# Patient Record
Sex: MALE | Race: OTHER | Employment: FULL TIME | URBAN - METROPOLITAN AREA
[De-identification: names, ages, dates, MRNs, and addresses within clinical notes are randomized per-mention and may not be internally consistent; named-entity substitution may affect disease eponyms.]

---

## 2017-05-10 ENCOUNTER — OFFICE VISIT (OUTPATIENT)
Dept: FAMILY MEDICINE CLINIC | Age: 57
End: 2017-05-10

## 2017-05-10 VITALS
SYSTOLIC BLOOD PRESSURE: 126 MMHG | BODY MASS INDEX: 26.92 KG/M2 | OXYGEN SATURATION: 98 % | HEART RATE: 82 BPM | RESPIRATION RATE: 16 BRPM | HEIGHT: 70 IN | DIASTOLIC BLOOD PRESSURE: 78 MMHG | TEMPERATURE: 98.2 F | WEIGHT: 188 LBS

## 2017-05-10 DIAGNOSIS — Z00.00 ROUTINE MEDICAL EXAM: Primary | ICD-10-CM

## 2017-05-10 DIAGNOSIS — Z23 ENCOUNTER FOR IMMUNIZATION: ICD-10-CM

## 2017-05-10 DIAGNOSIS — Z11.59 SCREENING FOR VIRAL DISEASE: ICD-10-CM

## 2017-05-10 DIAGNOSIS — Z13.6 SCREENING FOR CARDIOVASCULAR CONDITION: ICD-10-CM

## 2017-05-10 DIAGNOSIS — N52.9 ERECTILE DYSFUNCTION, UNSPECIFIED ERECTILE DYSFUNCTION TYPE: ICD-10-CM

## 2017-05-10 DIAGNOSIS — Z12.11 SCREENING FOR COLON CANCER: ICD-10-CM

## 2017-05-10 DIAGNOSIS — Z12.5 SCREENING FOR PROSTATE CANCER: ICD-10-CM

## 2017-05-10 RX ORDER — ZOLPIDEM TARTRATE 5 MG/1
TABLET ORAL
Qty: 90 TAB | Refills: 0 | OUTPATIENT
Start: 2017-05-10 | End: 2017-11-30 | Stop reason: SDUPTHER

## 2017-05-10 RX ORDER — SILDENAFIL 100 MG/1
TABLET, FILM COATED ORAL
Qty: 20 TAB | Refills: 2 | OUTPATIENT
Start: 2017-05-10 | End: 2018-10-11 | Stop reason: SDUPTHER

## 2017-05-10 NOTE — MR AVS SNAPSHOT
Visit Information Date & Time Provider Department Dept. Phone Encounter #  
 5/10/2017  2:20 PM Wesley Montana, [address] [id number] Upcoming Health Maintenance Date Due Hepatitis C Screening 7/20/2017* INFLUENZA AGE 9 TO ADULT 8/1/2017 COLONOSCOPY 10/6/2020 DTaP/Tdap/Td series (2 - Td) 5/10/2027 *Topic was postponed. The date shown is not the original due date. Allergies as of 5/10/2017  Review Complete On: 5/10/2017 By: Wesley Montana MD  
  
 Severity Noted Reaction Type Reactions Naprosyn [Naproxen]  10/19/2010   Intolerance Other (comments) Felt like very drowsy and near syncope. Current Immunizations  Reviewed on 5/10/2017 Name Date Influenza Vaccine 10/1/2014 Influenza Vaccine (Quad) PF 11/28/2016 Influenza Vaccine Split 10/6/2010 Tdap  Incomplete Reviewed by Wesley Montana MD on 5/10/2017 at  3:03 PM  
You Were Diagnosed With   
  
 Codes Comments Routine medical exam    -  Primary ICD-10-CM: Z00.00 ICD-9-CM: V70.0 Erectile dysfunction, unspecified erectile dysfunction type     ICD-10-CM: N52.9 ICD-9-CM: 607.84 Screening for cardiovascular condition     ICD-10-CM: Z13.6 ICD-9-CM: V81.2 Screening for viral disease     ICD-10-CM: Z11.59 
ICD-9-CM: V73.99 Screening for prostate cancer     ICD-10-CM: Z12.5 ICD-9-CM: V76.44 Screening for colon cancer     ICD-10-CM: Z12.11 ICD-9-CM: V76.51 Encounter for immunization     ICD-10-CM: H51 ICD-9-CM: V03.89 Vitals BP Pulse Temp Resp Height(growth percentile) Weight(growth percentile) 126/78 (BP 1 Location: Left arm, BP Patient Position: Sitting) 82 98.2 °F (36.8 °C) (Oral) 16 5' 10\" (1.778 m) 188 lb (85.3 kg) SpO2 BMI Smoking Status 98% 26.98 kg/m2 Former Smoker BMI and BSA Data Body Mass Index Body Surface Area  
 26.98 kg/m 2 2.05 m 2 Preferred Pharmacy Pharmacy Name Phone [address], [facility] [address] [phone] Your Updated Medication List  
  
   
This list is accurate as of: 5/10/17  3:26 PM.  Always use your most recent med list.  
  
  
  
  
 ASPIR-81 81 mg tablet Generic drug:  aspirin delayed-release Take 81 mg by mouth daily. MULTI-VITAMIN PO Take  by mouth.  
  
 sildenafil citrate 100 mg tablet Commonly known as:  VIAGRA Take daily as needed  
  
 zolpidem 5 mg tablet Commonly known as:  AMBIEN  
take 1 tablet by mouth every evening for sleep Prescriptions Printed Refills  
 zolpidem (AMBIEN) 5 mg tablet 0 Sig: take 1 tablet by mouth every evening for sleep Class: Print  
 sildenafil citrate (VIAGRA) 100 mg tablet 2 Sig: Take daily as needed Class: Print We Performed the Following TETANUS, DIPHTHERIA TOXOIDS AND ACELLULAR PERTUSSIS VACCINE (TDAP), IN INDIVIDS. >=7, IM [id number] CPT(R)] To-Do List   
 05/24/2017 Lab:  HEPATITIS C AB   
  
 05/24/2017 Lab:  LIPID PANEL   
  
 05/24/2017 Lab:  METABOLIC PANEL, BASIC   
  
 05/24/2017 Lab:  OCCULT BLOOD, IMMUNOASSAY (FIT)   
  
 05/24/2017 Lab:  PSA W/ REFLX FREE PSA Patient Instructions Well Visit, Men 48 to 72: Care Instructions Your Care Instructions Physical exams can help you stay healthy. Your doctor has checked your overall health and may have suggested ways to take good care of yourself. He or she also may have recommended tests. At home, you can help prevent illness with healthy eating, regular exercise, and other steps. Follow-up care is a key part of your treatment and safety. Be sure to make and go to all appointments, and call your doctor if you are having problems. It's also a good idea to know your test results and keep a list of the medicines you take. How can you care for yourself at home? · Reach and stay at a healthy weight. This will lower your risk for many problems, such as obesity, diabetes, heart disease, and high blood pressure. · Get at least 30 minutes of exercise on most days of the week. Walking is a good choice. You also may want to do other activities, such as running, swimming, cycling, or playing tennis or team sports. · Do not smoke. Smoking can make health problems worse. If you need help quitting, talk to your doctor about stop-smoking programs and medicines. These can increase your chances of quitting for good. · Protect your skin from too much sun. When you're outdoors from 10 a.m. to 4 p.m., stay in the shade or cover up with clothing and a hat with a wide brim. Wear sunglasses that block UV rays. Even when it's cloudy, put broad-spectrum sunscreen (SPF 30 or higher) on any exposed skin. · See a dentist one or two times a year for checkups and to have your teeth cleaned. · Wear a seat belt in the car. · Limit alcohol to 2 drinks a day. Too much alcohol can cause health problems. Follow your doctor's advice about when to have certain tests. These tests can spot problems early. · Cholesterol. Your doctor will tell you how often to have this done based on your overall health and other things that can increase your risk for heart attack and stroke. · Blood pressure. Have your blood pressure checked during a routine doctor visit. Your doctor will tell you how often to check your blood pressure based on your age, your blood pressure results, and other factors. · Prostate exam. Talk to your doctor about whether you should have a blood test (called a PSA test) for prostate cancer. Experts disagree on whether men should have this test. Some experts recommend that you discuss the benefits and risks of the test with your doctor. · Diabetes. Ask your doctor whether you should have tests for diabetes. · Vision.  Some experts recommend that you have yearly exams for glaucoma and other age-related eye problems starting at age 48. · Hearing. Tell your doctor if you notice any change in your hearing. You can have tests to find out how well you hear. · Colon cancer. You should begin tests for colon cancer at age 48. You may have one of several tests. Your doctor will tell you how often to have tests based on your age and risk. Risks include whether you already had a precancerous polyp removed from your colon or whether your parent, brother, sister, or child has had colon cancer. · Heart attack and stroke risk. At least every 4 to 6 years, you should have your risk for heart attack and stroke assessed. Your doctor uses factors such as your age, blood pressure, cholesterol, and whether you smoke or have diabetes to show what your risk for a heart attack or stroke is over the next 10 years. · Abdominal aortic aneurysm. Ask your doctor whether you should have a test to check for an aneurysm. You may need a test if you ever smoked or if your parent, brother, sister, or child has had an aneurysm. When should you call for help? Watch closely for changes in your health, and be sure to contact your doctor if you have any problems or symptoms that concern you. Where can you learn more? Go to http://keith-jermaine.info/. Enter Y611 in the search box to learn more about \"Well Visit, Men 48 to 72: Care Instructions. \" Current as of: July 19, 2016 Content Version: 11.2 © 0052-3476 Healthwise, Incorporated. Care instructions adapted under license by [employer] (which disclaims liability or warranty for this information). If you have questions about a medical condition or this instruction, always ask your healthcare professional. [place] [address] any warranty or liability for your use of this information. Introducing [facility]!    
 Eliel Hicks introduces [employer] patient portal. Now you can access parts of your medical record, email your doctor's office, and request medication refills online. 1. In your internet browser, go to [email or web address] 2. Click on the First Time User? Click Here link in the Sign In box. You will see the New Member Sign Up page. 3. Enter your [employer] Access Code exactly as it appears below. You will not need to use this code after youve completed the sign-up process. If you do not sign up before the expiration date, you must request a new code. · [employer] Access Code: [email or web address] Expires: 8/8/2017  3:25 PM 
 
4. Enter the last four digits of your Social Security Number (xxxx) and Date of Birth (mm/dd/yyyy) as indicated and click Submit. You will be taken to the next sign-up page. 5. Create a [employer] ID. This will be your [employer] login ID and cannot be changed, so think of one that is secure and easy to remember. 6. Create a [employer] password. You can change your password at any time. 7. Enter your Password Reset Question and Answer. This can be used at a later time if you forget your password. 8. Enter your e-mail address. You will receive e-mail notification when new information is available in [address] 9. Click Sign Up. You can now view and download portions of your medical record. 10. Click the Download Summary menu link to download a portable copy of your medical information. If you have questions, please visit the Frequently Asked Questions section of the [employer] website. Remember, [employer] is NOT to be used for urgent needs. For medical emergencies, dial 911. Now available from your iPhone and Android! Please provide this summary of care documentation to your next provider. Your primary care clinician is listed as [address]. If you have any questions after today's visit, please call [phone].

## 2017-05-10 NOTE — PROGRESS NOTES
Subjective:     Bladimir Lopez is a 61 y.o. male presenting for annual exam and complete physical.     He feels well;    Desires prostate cancer screening at this time  (Current AAFP, USPSTF, and AUA recommendation statements reviewed)      PMH,  Meds, Allergies, Family History, Social history reviewed        Patient Active Problem List   Diagnosis Code    Atrial fibrillation ([facility] [[email or web address]] 75.) I48.91    Cardiac valvular malformation Q24.8    Screening for colon cancer Z12.11    Onychomycosis B35.1    Skin cancer screening Z12.83    Seasonal allergic rhinitis J30.2    Other general counseling and advice for contraceptive management Z30.09    Sterilization Z30.2     Current Outpatient Prescriptions   Medication Sig Dispense Refill    zolpidem (AMBIEN) 5 mg tablet take 1 tablet by mouth every evening for sleep 90 Tab 0    sildenafil citrate (VIAGRA) 100 mg tablet Take daily as needed 20 Tab 2    MULTIVITAMINS (MULTI-VITAMIN PO) Take  by mouth.  aspirin delayed-release (ASPIR-81) 81 mg tablet Take 81 mg by mouth daily. Allergies   Allergen Reactions    Naprosyn [Naproxen] Other (comments)     Felt like very drowsy and near syncope.      Past Medical History:   Diagnosis Date    [facility] 1/5/2010    Cardiac valvular malformation 1/5/2010    H/O colonoscopy 2010    f/u 10 years Dr. Lela Aguilar     Past Surgical History:   Procedure Laterality Date   [address]    open heart surgery to remove a web on the left atrium    HX KNEE ARTHROSCOPY  2008    left knee meniscus tear    HX VASECTOMY  11/1/2014    REVISE ULNAR NERVE AT ELBOW  2007     Family History   Problem Relation Age of Onset    Heart Disease Mother     Diabetes Maternal Grandmother             Lab Results  Component Value Date/Time   Cholesterol, total 167 04/13/2015 08:14 AM   HDL Cholesterol 66 04/13/2015 08:14 AM   LDL, calculated 93 04/13/2015 08:14 AM   Triglyceride 41 04/13/2015 08:14 AM CHOL/HDL Ratio 2.9 01/19/2010 08:24 AM       Lab Results  Component Value Date/Time   ALT (SGPT) 41 05/28/2010 03:56 PM   AST (SGOT) 19 05/28/2010 03:56 PM   Alk. phosphatase 72 05/28/2010 03:56 PM   Bilirubin, direct 0.2 05/28/2010 03:56 PM   Bilirubin, total 0.9 05/28/2010 03:56 PM       Lab Results  Component Value Date/Time   GFR est  04/13/2015 08:14 AM   GFR est non-AA 97 04/13/2015 08:14 AM   Creatinine 0.87 04/13/2015 08:14 AM   BUN 12 04/13/2015 08:14 AM   Sodium 140 04/13/2015 08:14 AM   Potassium 4.8 04/13/2015 08:14 AM   Chloride 101 04/13/2015 08:14 AM   CO2 22 04/13/2015 08:14 AM      Lab Results  Component Value Date/Time   Prostate Specific Ag 0.6 04/13/2015 08:14 AM   Prostate Specific Ag 0.8 11/08/2012 12:00 AM   Prostate Specific Ag 0.4 10/06/2010 03:44 PM        Review of Systems  A comprehensive review of systems was negative except for that written in the HPI.     Objective:     Visit Vitals    /78 (BP 1 Location: Left arm, BP Patient Position: Sitting)    Pulse 82    Temp 98.2 °F (36.8 °C) (Oral)    Resp 16    Ht 5' 10\" (1.778 m)    Wt 188 lb (85.3 kg)    SpO2 98%    BMI 26.98 kg/m2     Physical exam:   General appearance - alert, well appearing, and in no distress  Ears - bilateral TM's and external ear canals normal  Nose - normal and patent, no erythema, discharge or polyps  Mouth - mucous membranes moist, pharynx normal without lesions  Neck - supple, no significant adenopathy  Lymphatics - no palpable lymphadenopathy, no hepatosplenomegaly  Chest - clear to auscultation, no wheezes, rales or rhonchi, symmetric air entry  Heart - normal rate, regular rhythm, normal S1, S2, no murmurs, rubs, clicks or gallops  Abdomen - soft, nontender, nondistended, no masses or organomegaly  Musculoskeletal - no joint tenderness, deformity or swelling  Extremities - no pedal edema noted  Skin - normal coloration and turgor, no rashes, no suspicious skin lesions noted   Prostate exam: smooth and symmetric without nodules or tenderness. Assessment/Plan:       increase physical activity, follow low fat diet, follow low salt diet, continue present plan, routine labs ordered. ICD-10-CM ICD-9-CM    1. Routine medical exam Z00.00 V70.0    2. Erectile dysfunction, unspecified erectile dysfunction type- for refill only N52.9 607.84    3. Screening for cardiovascular condition Z13.6 V81.2 LIPID PANEL      METABOLIC PANEL, BASIC   4. Screening for viral disease Z11.59 V73.99 HEPATITIS C AB   5. Screening for prostate cancer Z12.5 V76.44 PSA W/ REFLX FREE PSA   6. Screening for colon cancer Z12.11 V76.51 OCCULT BLOOD, IMMUNOASSAY (FIT)   7. Encounter for immunization Z23 V03.89 TETANUS, DIPHTHERIA TOXOIDS AND ACELLULAR PERTUSSIS VACCINE (TDAP), IN INDIVIDS. >=7, IM   . As above, pt well and stable   treatment plan as listed below  Orders Placed This Encounter    Tetanus, diphtheria toxoids and acellular pertussis (TDAP) vaccine, in individuals >=7 years, IM    LIPID PANEL    METABOLIC PANEL, BASIC    HEPATITIS C AB    PSA W/ REFLX FREE PSA    OCCULT BLOOD, IMMUNOASSAY (FIT)    zolpidem (AMBIEN) 5 mg tablet    sildenafil citrate (VIAGRA) 100 mg tablet     Refilled meds needed  Follow-up Disposition: Not on File  An After Visit Summary was printed and given to the patient. This has been fully explained to the patient, who indicates understanding.

## 2017-05-10 NOTE — PATIENT INSTRUCTIONS

## 2017-05-10 NOTE — PROGRESS NOTES
1. Have you been to the ER, urgent care clinic since your last visit? Hospitalized since your last visit? No    2. Have you seen or consulted any other health care providers outside of the [address] [place] [address] since your last visit? Include any pap smears or colon screening.  Yes Where: Ludy Collins MD - [facility] - ophthalmology

## 2017-05-15 ENCOUNTER — HOSPITAL ENCOUNTER (OUTPATIENT)
Dept: LAB | Age: 57
Discharge: HOME | End: 2017-05-15
Payer: OTHER GOVERNMENT

## 2017-05-15 DIAGNOSIS — Z12.5 SCREENING FOR PROSTATE CANCER: ICD-10-CM

## 2017-05-15 DIAGNOSIS — Z11.59 SCREENING FOR VIRAL DISEASE: ICD-10-CM

## 2017-05-15 DIAGNOSIS — Z13.6 SCREENING FOR CARDIOVASCULAR CONDITION: ICD-10-CM

## 2017-05-15 LAB
ANION GAP BLD CALC-SCNC: 8 MMOL/L (ref 3–18)
BUN SERPL-MCNC: 17 MG/DL (ref 7–18)
BUN/CREAT SERPL: 19 (ref 12–20)
CALCIUM SERPL-MCNC: 8.9 MG/DL (ref 8.5–10.1)
CHLORIDE SERPL-SCNC: 104 MMOL/L (ref 100–108)
CHOLEST SERPL-MCNC: 183 MG/DL
CO2 SERPL-SCNC: 26 MMOL/L (ref 21–32)
CREAT SERPL-MCNC: 0.89 MG/DL (ref 0.6–1.3)
GLUCOSE SERPL-MCNC: 106 MG/DL (ref 74–99)
HDLC SERPL-MCNC: 63 MG/DL (ref 40–60)
HDLC SERPL: 2.9 {RATIO} (ref 0–5)
LDLC SERPL CALC-MCNC: 110.2 MG/DL (ref 0–100)
LIPID PROFILE,FLP: ABNORMAL
POTASSIUM SERPL-SCNC: 4.9 MMOL/L (ref 3.5–5.5)
SODIUM SERPL-SCNC: 138 MMOL/L (ref 136–145)
TRIGL SERPL-MCNC: 49 MG/DL (ref ?–150)
VLDLC SERPL CALC-MCNC: 9.8 MG/DL

## 2017-05-15 PROCEDURE — 80048 BASIC METABOLIC PNL TOTAL CA: CPT | Performed by: FAMILY MEDICINE

## 2017-05-15 PROCEDURE — 86803 HEPATITIS C AB TEST: CPT | Performed by: FAMILY MEDICINE

## 2017-05-15 PROCEDURE — 36415 COLL VENOUS BLD VENIPUNCTURE: CPT | Performed by: FAMILY MEDICINE

## 2017-05-15 PROCEDURE — 84153 ASSAY OF PSA TOTAL: CPT | Performed by: FAMILY MEDICINE

## 2017-05-15 PROCEDURE — 80061 LIPID PANEL: CPT | Performed by: FAMILY MEDICINE

## 2017-05-16 LAB
HCV AB SER IA-ACNC: 0.06 INDEX
HCV AB SERPL QL IA: NEGATIVE
HCV COMMENT,HCGAC: NORMAL
PSA SERPL-MCNC: 0.7 NG/ML (ref 0–4)
REFLEX CRITERIA: NORMAL

## 2017-06-07 ENCOUNTER — OFFICE VISIT (OUTPATIENT)
Dept: FAMILY MEDICINE CLINIC | Age: 57
End: 2017-06-07

## 2017-06-07 VITALS
SYSTOLIC BLOOD PRESSURE: 120 MMHG | RESPIRATION RATE: 16 BRPM | DIASTOLIC BLOOD PRESSURE: 96 MMHG | HEART RATE: 89 BPM | BODY MASS INDEX: 27.2 KG/M2 | OXYGEN SATURATION: 98 % | TEMPERATURE: 98.1 F | HEIGHT: 70 IN | WEIGHT: 190 LBS

## 2017-06-07 DIAGNOSIS — M54.2 NECK PAIN: Primary | ICD-10-CM

## 2017-06-07 RX ORDER — GABAPENTIN 300 MG/1
300 CAPSULE ORAL
Qty: 30 CAP | Refills: 0 | Status: SHIPPED | OUTPATIENT
Start: 2017-06-07 | End: 2017-06-28 | Stop reason: ALTCHOICE

## 2017-06-07 NOTE — PROGRESS NOTES
HISTORY OF PRESENT ILLNESS  Neli Hay is a 61 y.o. male. HPI  Patient is here today for evaluation and treatment of: Back Pain    Back Pain: On Sunday pt developed a \" Sunburn\" like pain in the right upper back/neck area. There is no rash. The neck is sensitive, particularly to touch; Wearing a shirt or moving worsens sx. No numbness or tingling down the arms. Pain is a 2/10 in intensity. No focal weakness. No bladder or bowel incontinence. PMH,  Meds, Allergies, Family History, Social history reviewed        Current Outpatient Prescriptions:     zolpidem (AMBIEN) 5 mg tablet, take 1 tablet by mouth every evening for sleep, Disp: 90 Tab, Rfl: 0    sildenafil citrate (VIAGRA) 100 mg tablet, Take daily as needed, Disp: 20 Tab, Rfl: 2    MULTIVITAMINS (MULTI-VITAMIN PO), Take  by mouth., Disp: , Rfl:     aspirin delayed-release (ASPIR-81) 81 mg tablet, Take 81 mg by mouth daily. , Disp: , Rfl:     Review of Systems   Constitutional: Negative for chills and fever. Cardiovascular: Negative for chest pain and palpitations. Skin: Negative for itching and rash. Physical Exam   Constitutional: He appears well-nourished. No distress. HENT:   Head: Normocephalic and atraumatic. Musculoskeletal: He exhibits no edema. Skin: Skin is warm. No rash noted. No erythema. No pallor. The area of concern is a quarter and 1/2 sized area of superficial tenderness; There is no evidence of a rash, erythema, blistering or drainage. No extension of the tenderness up the neck or down the right upper back or arm   Nursing note and vitals reviewed. ASSESSMENT and PLAN    ICD-10-CM ICD-9-CM    1. Neck pain M54.2 723.1        As above, new ? Etiology ; pain seems to be more nerve related; no current evidence of a shingles rash.    treatment plan as listed below  Orders Placed This Encounter    gabapentin (NEURONTIN) 300 mg capsule     Monitor for skin break out, fever, focal weakness, paresthesias, if sx worsen , notify MD  Side effects of med reviewed  Follow-up Disposition:  Return in about 3 weeks (around 6/28/2017) for neck pain. An After Visit Summary was printed and given to the patient. This has been fully explained to the patient, who indicates understanding.

## 2017-06-07 NOTE — PROGRESS NOTES
1. Have you been to the ER, urgent care clinic since your last visit? Hospitalized since your last visit? No    2. Have you seen or consulted any other health care providers outside of the [address] [place] [address] since your last visit? Include any pap smears or colon screening.  No

## 2017-06-07 NOTE — MR AVS SNAPSHOT
Visit Information Date & Time Provider Department Dept. Phone Encounter #  
 6/7/2017 10:20 AM Wei Moody MD [facility] [id number] Follow-up Instructions Return in about 3 weeks (around 6/28/2017) for neck pain. Upcoming Health Maintenance Date Due INFLUENZA AGE 9 TO ADULT 8/1/2017 COLONOSCOPY 10/6/2020 DTaP/Tdap/Td series (2 - Td) 5/10/2027 Allergies as of 6/7/2017  Review Complete On: 6/7/2017 By: Naveen Morgan LPN Severity Noted Reaction Type Reactions Naprosyn [Naproxen]  10/19/2010   Intolerance Other (comments) Felt like very drowsy and near syncope. Current Immunizations  Reviewed on 5/10/2017 Name Date Influenza Vaccine 10/1/2014 Influenza Vaccine (Quad) PF 11/28/2016 Influenza Vaccine Split 10/6/2010 Tdap 5/10/2017 Not reviewed this visit You Were Diagnosed With   
  
 Codes Comments Neck pain    -  Primary ICD-10-CM: M54.2 ICD-9-CM: 723.1 Vitals BP Pulse Temp Resp Height(growth percentile) Weight(growth percentile) (!) 120/96 (BP 1 Location: Left arm, BP Patient Position: Sitting) 89 98.1 °F (36.7 °C) (Oral) 16 5' 10\" (1.778 m) 190 lb (86.2 kg) SpO2 BMI Smoking Status 98% 27.26 kg/m2 Former Smoker BMI and BSA Data Body Mass Index Body Surface Area  
 27.26 kg/m 2 2.06 m 2 Preferred Pharmacy Pharmacy Name Phone [facility] [address] [place] [address] [phone] Your Updated Medication List  
  
   
This list is accurate as of: 6/7/17 11:04 AM.  Always use your most recent med list.  
  
  
  
  
 ASPIR-81 81 mg tablet Generic drug:  aspirin delayed-release Take 81 mg by mouth daily. gabapentin 300 mg capsule Commonly known as:  NEURONTIN Take 1 Cap by mouth two (2) times daily as needed. MULTI-VITAMIN PO Take  by mouth.  
  
 sildenafil citrate 100 mg tablet Commonly known as:  VIAGRA Take daily as needed  
  
 zolpidem 5 mg tablet Commonly known as:  AMBIEN  
take 1 tablet by mouth every evening for sleep Prescriptions Sent to Pharmacy Refills  
 gabapentin (NEURONTIN) 300 mg capsule 0 Sig: Take 1 Cap by mouth two (2) times daily as needed. Class: Normal  
 Pharmacy: [id number] [address] #: [phone] Route: Oral  
  
Follow-up Instructions Return in about 3 weeks (around 6/28/2017) for neck pain. Introducing [facility]! [employer] introduces [employer] patient portal. Now you can access parts of your medical record, email your doctor's office, and request medication refills online. 1. In your internet browser, go to [email or web address] 2. Click on the First Time User? Click Here link in the Sign In box. You will see the New Member Sign Up page. 3. Enter your [employer] Access Code exactly as it appears below. You will not need to use this code after youve completed the sign-up process. If you do not sign up before the expiration date, you must request a new code. · [employer] Access Code: [email or web address] Expires: 8/8/2017  3:25 PM 
 
4. Enter the last four digits of your Social Security Number (xxxx) and Date of Birth (mm/dd/yyyy) as indicated and click Submit. You will be taken to the next sign-up page. 5. Create a [employer] ID. This will be your [employer] login ID and cannot be changed, so think of one that is secure and easy to remember. 6. Create a [employer] password. You can change your password at any time. 7. Enter your Password Reset Question and Answer. This can be used at a later time if you forget your password. 8. Enter your e-mail address. You will receive e-mail notification when new information is available in [address] 9. Click Sign Up. You can now view and download portions of your medical record. 10. Click the Download Summary menu link to download a portable copy of your medical information. If you have questions, please visit the Frequently Asked Questions section of the [employer] website. Remember, [employer] is NOT to be used for urgent needs. For medical emergencies, dial 911. Now available from your iPhone and Android! Please provide this summary of care documentation to your next provider. Your primary care clinician is listed as [address] [facility]. If you have any questions after today's visit, please call [phone].

## 2017-06-28 ENCOUNTER — OFFICE VISIT (OUTPATIENT)
Dept: FAMILY MEDICINE CLINIC | Age: 57
End: 2017-06-28

## 2017-06-28 ENCOUNTER — HOSPITAL ENCOUNTER (OUTPATIENT)
Dept: GENERAL RADIOLOGY | Age: 57
Discharge: HOME | End: 2017-06-28
Payer: OTHER GOVERNMENT

## 2017-06-28 VITALS
TEMPERATURE: 97.9 F | BODY MASS INDEX: 27.06 KG/M2 | WEIGHT: 189 LBS | SYSTOLIC BLOOD PRESSURE: 116 MMHG | HEART RATE: 82 BPM | HEIGHT: 70 IN | RESPIRATION RATE: 16 BRPM | DIASTOLIC BLOOD PRESSURE: 84 MMHG | OXYGEN SATURATION: 98 %

## 2017-06-28 DIAGNOSIS — D22.9 NEVUS: ICD-10-CM

## 2017-06-28 DIAGNOSIS — M62.838 MUSCLE SPASMS OF NECK: ICD-10-CM

## 2017-06-28 DIAGNOSIS — M50.30 DDD (DEGENERATIVE DISC DISEASE), CERVICAL: ICD-10-CM

## 2017-06-28 DIAGNOSIS — M62.838 MUSCLE SPASMS OF NECK: Primary | ICD-10-CM

## 2017-06-28 PROCEDURE — 72050 X-RAY EXAM NECK SPINE 4/5VWS: CPT

## 2017-06-28 RX ORDER — CYCLOBENZAPRINE HCL 10 MG
10 TABLET ORAL
Qty: 30 TAB | Refills: 0 | Status: SHIPPED | OUTPATIENT
Start: 2017-06-28 | End: 2018-10-11 | Stop reason: ALTCHOICE

## 2017-06-28 NOTE — PROGRESS NOTES
HISTORY OF PRESENT ILLNESS  Miguelina Amaya is a 61 y.o. male. HPI  Patient is here today for evaluation and treatment of: Neck Pain / Mole    States he still feels a a superficial pain in his right upper back; he also has a mole on the back as well that he wants checked. . The pain sensation is constant ; No change in activity;  Area is not significantly painful;  Feels like a burn; no radiation of the pain; sensation does not  cross over the spine ;  Has an ergonomically correct chair at work. Mole was noticed about one month ago. There is no drainage;   neurontin was not helpful    PMH,  Meds, Allergies, Family History, Social history reviewed        Current Outpatient Prescriptions:     cyclobenzaprine (FLEXERIL) 10 mg tablet, Take 1 Tab by mouth two (2) times daily as needed for Muscle Spasm(s). , Disp: 30 Tab, Rfl: 0    zolpidem (AMBIEN) 5 mg tablet, take 1 tablet by mouth every evening for sleep, Disp: 90 Tab, Rfl: 0    sildenafil citrate (VIAGRA) 100 mg tablet, Take daily as needed, Disp: 20 Tab, Rfl: 2    MULTIVITAMINS (MULTI-VITAMIN PO), Take  by mouth., Disp: , Rfl:     aspirin delayed-release (ASPIR-81) 81 mg tablet, Take 81 mg by mouth daily. , Disp: , Rfl:     Review of Systems   Constitutional: Negative for chills and fever. Cardiovascular: Negative for chest pain and palpitations. Physical Exam   Constitutional: He appears well-developed and well-nourished. No distress. Cardiovascular: Normal rate and regular rhythm. Exam reveals no gallop and no friction rub. No murmur heard. Pulmonary/Chest: Breath sounds normal. No respiratory distress. He has no wheezes. Skin: Skin is warm and dry. No rash noted. No erythema. Mole noted at right lower back; uniform in color; no asymmetry; area of altered sensation unremarkable; no lesions no TTP;    Nursing note and vitals reviewed. ASSESSMENT and PLAN    ICD-10-CM ICD-9-CM    1.  Muscle spasms of neck M62.838 728.85 XR SPINE CERV 4 OR 5 V   2. Nevus D22.9 216.9        As above, not controlled   treatment plan as listed below  Orders Placed This Encounter    XR SPINE CERV 4 OR 5 V    cyclobenzaprine (FLEXERIL) 10 mg tablet     Follow-up Disposition:  Return if symptoms worsen or fail to improve. An After Visit Summary was printed and given to the patient. This has been fully explained to the patient, who indicates understanding.

## 2017-06-28 NOTE — MR AVS SNAPSHOT
Visit Information Date & Time Provider Department Dept. Phone Encounter #  
 6/28/2017  2:00 PM [phone] [id number] Upcoming Health Maintenance Date Due INFLUENZA AGE 9 TO ADULT 8/1/2017 COLONOSCOPY 10/6/2020 DTaP/Tdap/Td series (2 - Td) 5/10/2027 Allergies as of 6/28/2017  Review Complete On: 6/28/2017 By: Paresh Hodge LPN Severity Noted Reaction Type Reactions Naprosyn [Naproxen]  10/19/2010   Intolerance Other (comments) Felt like very drowsy and near syncope. Current Immunizations  Reviewed on 5/10/2017 Name Date Influenza Vaccine 10/1/2014 Influenza Vaccine (Quad) PF 11/28/2016 Influenza Vaccine Split 10/6/2010 Tdap 5/10/2017 Not reviewed this visit You Were Diagnosed With   
  
 Codes Comments Muscle spasms of neck    -  Primary ICD-10-CM: J08.561 ICD-9-CM: 728.85 Atypical mole     ICD-10-CM: D22.9 ICD-9-CM: 216.9 Vitals BP Pulse Temp Resp Height(growth percentile) Weight(growth percentile) 116/84 (BP 1 Location: Left arm, BP Patient Position: Sitting) 82 97.9 °F (36.6 °C) (Oral) 16 5' 10\" (1.778 m) 189 lb (85.7 kg) SpO2 BMI Smoking Status 98% 27.12 kg/m2 Former Smoker BMI and BSA Data Body Mass Index Body Surface Area  
 27.12 kg/m 2 2.06 m 2 Preferred Pharmacy Pharmacy Name Phone [facility] [address] [place] [address] [phone] Your Updated Medication List  
  
   
This list is accurate as of: 6/28/17  2:39 PM.  Always use your most recent med list.  
  
  
  
  
 ASPIR-81 81 mg tablet Generic drug:  aspirin delayed-release Take 81 mg by mouth daily. cyclobenzaprine 10 mg tablet Commonly known as:  FLEXERIL Take 1 Tab by mouth two (2) times daily as needed for Muscle Spasm(s). MULTI-VITAMIN PO Take  by mouth. sildenafil citrate 100 mg tablet Commonly known as:  VIAGRA Take daily as needed  
  
 zolpidem 5 mg tablet Commonly known as:  AMBIEN  
take 1 tablet by mouth every evening for sleep Prescriptions Sent to Pharmacy Refills  
 cyclobenzaprine (FLEXERIL) 10 mg tablet 0 Sig: Take 1 Tab by mouth two (2) times daily as needed for Muscle Spasm(s). Class: Normal  
 Pharmacy: [id number] [place] [address], [address] #: [phone] Route: Oral  
  
To-Do List   
 06/28/2017 Imaging:  XR SPINE CERV 4 OR 5 V Patient Instructions Moles: Care Instructions Your Care Instructions Moles are skin growths made up of cells that produce color (pigment). A mole can appear anywhere on the skin, alone or in groups. Most people get a few moles during their first 20 years of life. They are usually brown in color but can be blue, black, or flesh-colored. Most moles are harmless and do not cause pain or other symptoms, unless you rub them or they bump against something. You usually do not need treatment for moles. But some can turn into cancer. Talk to your doctor if a mole bleeds, itches, burns, or changes size or color. Also let your doctor know if you get a new mole. Make sure to wear sunscreen and other sun protection every day to help prevent skin cancer. Follow-up care is a key part of your treatment and safety. Be sure to make and go to all appointments, and call your doctor if you are having problems. It's also a good idea to know your test results and keep a list of the medicines you take. How can you care for yourself at home? · Check all the skin on your body once a month for skin growths or other changes, such as in the color and feel of the skin. ¨  front of a full-length mirror. Look carefully at the front and back of your body. Then look at your right and left sides with your arms raised. [facility] your elbows and look carefully at your forearms, the back of your upper arms, and your palms. ¨ Look at your feet, the bottoms of your feet, and the spaces between your toes. ¨ Use a hand mirror to look at the back of your legs, the back of your neck, and your back, rear end (buttocks), and genital area. Part the hair on your head to look at your scalp. · If you see a change in a skin growth, contact your doctor. Look for: ¨ A mole that bleeds. ¨ A fast-growing mole. ¨ A scaly or crusted growth on the skin. ¨ A sore that will not heal. 
To prevent skin cancer · Always wear sunscreen on exposed skin. Make sure to use a broad-spectrum sunscreen that has a sun protection factor (SPF) of 30 or higher. Use it every day, even when it is cloudy. · Wear a wide-brimmed hat and long sleeves and pants if you are going to be outdoors for very long. · Avoid the sun between 10 a.m. and 4 p.m., which is the peak time for the sun's ultraviolet rays. · Avoid sunburns, tanning booths, and sunlamps. · Be sure to protect children from the sun. Sunburns in childhood damage the skin and increase the risk of cancer. When should you call for help? Watch closely for changes in your health, and be sure to contact your doctor if: · A mole looks different than it did before. It may have changed in size, color, shape, or the way it looks. Where can you learn more? Go to http://keith-jermaine.info/. Enter M489 in the search box to learn more about \"Moles: Care Instructions. \" Current as of: October 13, 2016 Content Version: 11.3 © 5385-5493 [employer]. Care instructions adapted under license by [employer] (which disclaims liability or warranty for this information). If you have questions about a medical condition or this instruction, always ask your healthcare professional. [place] [address] any warranty or liability for your use of this information. Introducing [facility]! [employer] introduces Vindicia patient portal. Now you can access parts of your medical record, email your doctor's office, and request medication refills online. 1. In your internet browser, go to [email or web address] 2. Click on the First Time User? Click Here link in the Sign In box. You will see the New Member Sign Up page. 3. Enter your Vindicia Access Code exactly as it appears below. You will not need to use this code after youve completed the sign-up process. If you do not sign up before the expiration date, you must request a new code. · Vindicia Access Code: [email or web address] Expires: 8/8/2017  3:25 PM 
 
4. Enter the last four digits of your Social Security Number (xxxx) and Date of Birth (mm/dd/yyyy) as indicated and click Submit. You will be taken to the next sign-up page. 5. Create a Vindicia ID. This will be your Vindicia login ID and cannot be changed, so think of one that is secure and easy to remember. 6. Create a Vindicia password. You can change your password at any time. 7. Enter your Password Reset Question and Answer. This can be used at a later time if you forget your password. 8. Enter your e-mail address. You will receive e-mail notification when new information is available in [address] 9. Click Sign Up. You can now view and download portions of your medical record. 10. Click the Download Summary menu link to download a portable copy of your medical information. If you have questions, please visit the Frequently Asked Questions section of the Vindicia website. Remember, Vindicia is NOT to be used for urgent needs. For medical emergencies, dial 911. Now available from your iPhone and Android! Please provide this summary of care documentation to your next provider. Your primary care clinician is listed as [address]. If you have any questions after today's visit, please call [phone].

## 2017-06-28 NOTE — PROGRESS NOTES
1. Have you been to the ER, urgent care clinic since your last visit? Hospitalized since your last visit? No    2. Have you seen or consulted any other health care providers outside of the [address] [place] [address] since your last visit? Include any pap smears or colon screening.  No

## 2017-06-28 NOTE — PATIENT INSTRUCTIONS
Moles: Care Instructions  Your Care Instructions  Moles are skin growths made up of cells that produce color (pigment). A mole can appear anywhere on the skin, alone or in groups. Most people get a few moles during their first 20 years of life. They are usually brown in color but can be blue, black, or flesh-colored. Most moles are harmless and do not cause pain or other symptoms, unless you rub them or they bump against something. You usually do not need treatment for moles. But some can turn into cancer. Talk to your doctor if a mole bleeds, itches, burns, or changes size or color. Also let your doctor know if you get a new mole. Make sure to wear sunscreen and other sun protection every day to help prevent skin cancer. Follow-up care is a key part of your treatment and safety. Be sure to make and go to all appointments, and call your doctor if you are having problems. It's also a good idea to know your test results and keep a list of the medicines you take. How can you care for yourself at home? · Check all the skin on your body once a month for skin growths or other changes, such as in the color and feel of the skin. ¨  front of a full-length mirror. Look carefully at the front and back of your body. Then look at your right and left sides with your arms raised. [facility] your elbows and look carefully at your forearms, the back of your upper arms, and your palms. ¨ Look at your feet, the bottoms of your feet, and the spaces between your toes. ¨ Use a hand mirror to look at the back of your legs, the back of your neck, and your back, rear end (buttocks), and genital area. Part the hair on your head to look at your scalp. · If you see a change in a skin growth, contact your doctor. Look for:  ¨ A mole that bleeds. ¨ A fast-growing mole. ¨ A scaly or crusted growth on the skin. ¨ A sore that will not heal.  To prevent skin cancer  · Always wear sunscreen on exposed skin.  Make sure to use a broad-spectrum sunscreen that has a sun protection factor (SPF) of 30 or higher. Use it every day, even when it is cloudy. · Wear a wide-brimmed hat and long sleeves and pants if you are going to be outdoors for very long. · Avoid the sun between 10 a.m. and 4 p.m., which is the peak time for the sun's ultraviolet rays. · Avoid sunburns, tanning booths, and sunlamps. · Be sure to protect children from the sun. Sunburns in childhood damage the skin and increase the risk of cancer. When should you call for help? Watch closely for changes in your health, and be sure to contact your doctor if:  · A mole looks different than it did before. It may have changed in size, color, shape, or the way it looks. Where can you learn more? Go to http://keith-jermaine.info/. Enter M489 in the search box to learn more about \"Moles: Care Instructions. \"  Current as of: October 13, 2016  Content Version: 11.3  © 4092-8319 [employer]. Care instructions adapted under license by [employer] (which disclaims liability or warranty for this information). If you have questions about a medical condition or this instruction, always ask your healthcare professional. [address] any warranty or liability for your use of this information.

## 2017-07-24 ENCOUNTER — TELEPHONE (OUTPATIENT)
Dept: FAMILY MEDICINE CLINIC | Age: 57
End: 2017-07-24

## 2017-07-24 NOTE — TELEPHONE ENCOUNTER
----- Message from Angelina Alexandra MD sent at 7/21/2017 12:15 PM EDT -----  Notify Pt. Send to spine.

## 2017-07-24 NOTE — TELEPHONE ENCOUNTER
Spoke with patient to inform as per Dr. Jennifer Post, that cervical x-ray showed degenerative disc disease and a referral to spine specialist has been placed in the system.  Patient verbalized understanding and knows that [facility] will contact by mail about referral.

## 2017-11-30 NOTE — TELEPHONE ENCOUNTER
Requested Prescriptions     Pending Prescriptions Disp Refills    zolpidem (AMBIEN) 5 mg tablet 90 Tab 0     Sig: take 1 tablet by mouth every evening for sleep

## 2017-12-04 RX ORDER — ZOLPIDEM TARTRATE 5 MG/1
TABLET ORAL
Qty: 90 TAB | Refills: 0 | Status: SHIPPED | OUTPATIENT
Start: 2017-12-04 | End: 2019-05-10 | Stop reason: SDUPTHER

## 2017-12-05 ENCOUNTER — TELEPHONE (OUTPATIENT)
Dept: FAMILY MEDICINE CLINIC | Age: 57
End: 2017-12-05

## 2017-12-15 ENCOUNTER — CLINICAL SUPPORT (OUTPATIENT)
Dept: FAMILY MEDICINE CLINIC | Age: 57
End: 2017-12-15

## 2017-12-15 DIAGNOSIS — Z23 ENCOUNTER FOR IMMUNIZATION: Primary | ICD-10-CM

## 2018-10-11 ENCOUNTER — OFFICE VISIT (OUTPATIENT)
Dept: FAMILY MEDICINE CLINIC | Age: 58
End: 2018-10-11

## 2018-10-11 VITALS
HEIGHT: 70 IN | TEMPERATURE: 98.1 F | RESPIRATION RATE: 16 BRPM | WEIGHT: 187 LBS | DIASTOLIC BLOOD PRESSURE: 74 MMHG | HEART RATE: 75 BPM | SYSTOLIC BLOOD PRESSURE: 111 MMHG | BODY MASS INDEX: 26.77 KG/M2 | OXYGEN SATURATION: 95 %

## 2018-10-11 DIAGNOSIS — M62.838 MUSCLE SPASM OF RIGHT SHOULDER: ICD-10-CM

## 2018-10-11 DIAGNOSIS — M25.511 ACUTE PAIN OF RIGHT SHOULDER: Primary | ICD-10-CM

## 2018-10-11 RX ORDER — CYCLOBENZAPRINE HCL 10 MG
10 TABLET ORAL
Qty: 30 TAB | Refills: 0 | Status: SHIPPED | OUTPATIENT
Start: 2018-10-11 | End: 2019-11-11 | Stop reason: ALTCHOICE

## 2018-10-11 RX ORDER — SILDENAFIL 100 MG/1
100 TABLET, FILM COATED ORAL
Qty: 20 TAB | Refills: 2 | Status: SHIPPED | OUTPATIENT
Start: 2018-10-11 | End: 2019-11-19 | Stop reason: SDUPTHER

## 2018-10-11 NOTE — PATIENT INSTRUCTIONS
Shoulder Stretches: Exercises Your Care Instructions Here are some examples of exercises for your shoulder. Start each exercise slowly. Ease off the exercise if you start to have pain. Your doctor or physical therapist will tell you when you can start these exercises and which ones will work best for you. How to do the exercises Shoulder stretch 1.  a doorway and place one arm against the door frame. Your elbow should be a little higher than your shoulder. 2. Relax your shoulders as you lean forward, allowing your chest and shoulder muscles to stretch. You can also turn your body slightly away from your arm to stretch the muscles even more. 3. Hold for 15 to 30 seconds. 4. Repeat 2 to 4 times with each arm. Shoulder and chest stretch 1. Shoulder and chest stretch 2. While sitting, relax your upper body so you slump slightly in your chair. 3. As you breathe in, straighten your back and open your arms out to the sides. 4. Gently pull your shoulder blades back and downward. 5. Hold for 15 to 30 seconds as your breathe normally. 6. Repeat 2 to 4 times. Overhead stretch 1. Reach up over your head with both arms. 2. Hold for 15 to 30 seconds. 3. Repeat 2 to 4 times. Follow-up care is a key part of your treatment and safety. Be sure to make and go to all appointments, and call your doctor if you are having problems. It's also a good idea to know your test results and keep a list of the medicines you take. Where can you learn more? Go to http://keith-jermaine.info/. Enter S254 in the search box to learn more about \"Shoulder Stretches: Exercises. \" Current as of: November 29, 2017 Content Version: 11.8 © 5669-9454 [employer]. Care instructions adapted under license by [employer] (which disclaims liability or warranty for this information).  If you have questions about a medical condition or this instruction, always ask your healthcare professional. [place] [address] any warranty or liability for your use of this information.

## 2018-10-11 NOTE — PROGRESS NOTES
1. Have you been to the ER, urgent care clinic since your last visit? Hospitalized since your last visit? No 
 
2. Have you seen or consulted any other health care providers outside of the [address] [place] [address] since your last visit? Include any pap smears or colon screening.  No

## 2018-10-11 NOTE — MR AVS SNAPSHOT
[facility] 
 
 
 [address] [place] [address] 
[phone] Patient: Ivan Li MRN: [id number] :1960 Visit Information Date & Time Provider Department Dept. Phone Encounter #  
 10/11/2018  6:00 PM [place] [address] [phone] [id number] Follow-up Instructions Return if symptoms worsen or fail to improve. Upcoming Health Maintenance Date Due Shingrix Vaccine Age 50> (1 of 2) 2009 Influenza Age 5 to Adult 2018 COLONOSCOPY 10/6/2020 DTaP/Tdap/Td series (2 - Td) 5/10/2027 Allergies as of 10/11/2018  Review Complete On: 10/11/2018 By: Ole Maurice LPN Severity Noted Reaction Type Reactions Naprosyn [Naproxen]  10/19/2010   Intolerance Other (comments) Felt like very drowsy and near syncope. Current Immunizations  Reviewed on 5/10/2017 Name Date Influenza Vaccine 10/1/2014 Influenza Vaccine (Quad) PF 12/15/2017, 2016 Influenza Vaccine Split 10/6/2010 Tdap 5/10/2017 Not reviewed this visit You Were Diagnosed With   
  
 Codes Comments Acute pain of right shoulder    -  Primary ICD-10-CM: M25.511 ICD-9-CM: 719.41 Muscle spasm of right shoulder     ICD-10-CM: Z66.490 ICD-9-CM: 728.85 Vitals BP Pulse Temp Resp Height(growth percentile) Weight(growth percentile) 111/74 (BP 1 Location: Left arm, BP Patient Position: Sitting) 75 98.1 °F (36.7 °C) (Oral) 16 5' 10\" (1.778 m) 187 lb (84.8 kg) SpO2 BMI Smoking Status 95% 26.83 kg/m2 Former Smoker BMI and BSA Data Body Mass Index Body Surface Area  
 26.83 kg/m 2 2.05 m 2 Preferred Pharmacy Pharmacy Name Phone [address] [facility] [address]13 [facility] [phone] Your Updated Medication List  
  
   
 This list is accurate as of 10/11/18  6:40 PM.  Always use your most recent med list.  
  
  
  
  
 ASPIR-81 81 mg tablet Generic drug:  aspirin delayed-release Take 81 mg by mouth daily. cyclobenzaprine 10 mg tablet Commonly known as:  FLEXERIL Take 1 Tab by mouth two (2) times daily as needed for Muscle Spasm(s). MULTI-VITAMIN PO Take  by mouth.  
  
 sildenafil citrate 100 mg tablet Commonly known as:  VIAGRA Take 1 Tab by mouth daily as needed. zolpidem 5 mg tablet Commonly known as:  AMBIEN  
take 1 tablet by mouth every evening for sleep Prescriptions Sent to Pharmacy Refills  
 sildenafil citrate (VIAGRA) 100 mg tablet 2 Sig: Take 1 Tab by mouth daily as needed. Class: Normal  
 Pharmacy: [address] [place] [address] Ph #: [phone] Route: Oral  
 cyclobenzaprine (FLEXERIL) 10 mg tablet 0 Sig: Take 1 Tab by mouth two (2) times daily as needed for Muscle Spasm(s). Class: Normal  
 Pharmacy: [address] [place] [address] Ph #: [phone] Route: Oral  
  
Follow-up Instructions Return if symptoms worsen or fail to improve. To-Do List   
 10/25/2018 Imaging:  XR SHOULDER RT AP/LAT MIN 2 V Patient Instructions Shoulder Stretches: Exercises Your Care Instructions Here are some examples of exercises for your shoulder. Start each exercise slowly. Ease off the exercise if you start to have pain. Your doctor or physical therapist will tell you when you can start these exercises and which ones will work best for you. How to do the exercises Shoulder stretch 1.  a doorway and place one arm against the door frame. Your elbow should be a little higher than your shoulder.  
2. Relax your shoulders as you lean forward, allowing your chest and shoulder muscles to stretch. You can also turn your body slightly away from your arm to stretch the muscles even more. 3. Hold for 15 to 30 seconds. 4. Repeat 2 to 4 times with each arm. Shoulder and chest stretch 1. Shoulder and chest stretch 2. While sitting, relax your upper body so you slump slightly in your chair. 3. As you breathe in, straighten your back and open your arms out to the sides. 4. Gently pull your shoulder blades back and downward. 5. Hold for 15 to 30 seconds as your breathe normally. 6. Repeat 2 to 4 times. Overhead stretch 1. Reach up over your head with both arms. 2. Hold for 15 to 30 seconds. 3. Repeat 2 to 4 times. Follow-up care is a key part of your treatment and safety. Be sure to make and go to all appointments, and call your doctor if you are having problems. It's also a good idea to know your test results and keep a list of the medicines you take. Where can you learn more? Go to http://keith-jermaine.info/. Enter S254 in the search box to learn more about \"Shoulder Stretches: Exercises. \" Current as of: November 29, 2017 Content Version: 11.8 © 1972-5755 [employer]. Care instructions adapted under license by [employer] (which disclaims liability or warranty for this information). If you have questions about a medical condition or this instruction, always ask your healthcare professional. [place] [address] any warranty or liability for your use of this information. Introducing [facility]! [employer] introduces Obviousidea patient portal. Now you can access parts of your medical record, email your doctor's office, and request medication refills online. 1. In your internet browser, go to [email or web address] 2. Click on the First Time User? Click Here link in the Sign In box. You will see the New Member Sign Up page. 3. Enter your Recurious Access Code exactly as it appears below. You will not need to use this code after youve completed the sign-up process. If you do not sign up before the expiration date, you must request a new code. · Recurious Access Code: [email or web address] Expires: 1/9/2019  5:48 PM 
 
4. Enter the last four digits of your Social Security Number (xxxx) and Date of Birth (mm/dd/yyyy) as indicated and click Submit. You will be taken to the next sign-up page. 5. Create a Recurious ID. This will be your Recurious login ID and cannot be changed, so think of one that is secure and easy to remember. 6. Create a Recurious password. You can change your password at any time. 7. Enter your Password Reset Question and Answer. This can be used at a later time if you forget your password. 8. Enter your e-mail address. You will receive e-mail notification when new information is available in [address]. 9. Click Sign Up. You can now view and download portions of your medical record. 10. Click the Download Summary menu link to download a portable copy of your medical information. If you have questions, please visit the Frequently Asked Questions section of the Recurious website. Remember, Recurious is NOT to be used for urgent needs. For medical emergencies, dial 911. Now available from your iPhone and Android! Please provide this summary of care documentation to your next provider. Your primary care clinician is listed as [address]. If you have any questions after today's visit, please call [phone].

## 2018-10-11 NOTE — PROGRESS NOTES
HISTORY OF PRESENT ILLNESS Nader Dewey is a 61 y.o. male. HPI Patient is here today for evaluation and treatment of:  Right Shoulder Pain Shoulder Pain: pt went hiking recently in [address] [place] [address]. States elisa driving back he was unable to position his shoulders correctly. Afterwards he developed pain in bilateral shoulders. Took ibuprofen . This helped the left side but the right shoulder has persisted ; He has radiation of the pain down the right arm; . Feels sore as if the area has been beaten. Sx are worse at night. Has been using heat to area. Current Outpatient Prescriptions:  
  zolpidem (AMBIEN) 5 mg tablet, take 1 tablet by mouth every evening for sleep, Disp: 90 Tab, Rfl: 0 
  sildenafil citrate (VIAGRA) 100 mg tablet, Take daily as needed, Disp: 20 Tab, Rfl: 2 
  MULTIVITAMINS (MULTI-VITAMIN PO), Take  by mouth., Disp: , Rfl:  
  aspirin delayed-release (ASPIR-81) 81 mg tablet, Take 81 mg by mouth daily. , Disp: , Rfl:  
 
PMH,  Meds, Allergies, Family History, Social history reviewed Review of Systems Constitutional: Negative for chills and fever. Cardiovascular: Negative for leg swelling. Neurological: Positive for sensory change (in right thumb). Physical Exam  
Constitutional: He appears well-developed and well-nourished. No distress. Cardiovascular: Normal rate and regular rhythm. Nursing note and vitals reviewed. Visit Vitals  /74 (BP 1 Location: Left arm, BP Patient Position: Sitting)  Pulse 75  Temp 98.1 °F (36.7 °C) (Oral)  Resp 16  
 Ht 5' 10\" (1.778 m)  Wt 187 lb (84.8 kg)  SpO2 95%  BMI 26.83 kg/m2  
right shoulder + TTP ; ROM intact; + spasm of muscle of right shoulder area ASSESSMENT and PLAN 
  ICD-10-CM ICD-9-CM 1. Acute pain of right shoulder M25.511 719.41 XR SHOULDER RT AP/LAT MIN 2 V  
2.  Muscle spasm of right shoulder M62.838 728.85 XR SHOULDER RT AP/LAT MIN 2 V  
 cyclobenzaprine (FLEXERIL) 10 mg tablet As above, new 
 treatment plan as listed below Orders Placed This Encounter  XR SHOULDER RT AP/LAT MIN 2 V  
 sildenafil citrate (VIAGRA) 100 mg tablet  cyclobenzaprine (FLEXERIL) 10 mg tablet Follow-up Disposition: 
Return if symptoms worsen or fail to improve. An After Visit Summary was printed and given to the patient. This has been fully explained to the patient, who indicates understanding.

## 2018-10-12 ENCOUNTER — HOSPITAL ENCOUNTER (OUTPATIENT)
Dept: GENERAL RADIOLOGY | Age: 58
Discharge: HOME | End: 2018-10-12
Payer: OTHER GOVERNMENT

## 2018-10-12 DIAGNOSIS — M62.838 MUSCLE SPASM OF RIGHT SHOULDER: ICD-10-CM

## 2018-10-12 DIAGNOSIS — M25.511 ACUTE PAIN OF RIGHT SHOULDER: ICD-10-CM

## 2018-10-12 PROCEDURE — 73030 X-RAY EXAM OF SHOULDER: CPT

## 2019-05-10 DIAGNOSIS — G47.00 INSOMNIA, UNSPECIFIED TYPE: Primary | ICD-10-CM

## 2019-05-14 RX ORDER — ZOLPIDEM TARTRATE 5 MG/1
TABLET ORAL
Qty: 30 TAB | Refills: 0 | Status: SHIPPED | OUTPATIENT
Start: 2019-05-14 | End: 2019-11-19 | Stop reason: SDUPTHER

## 2019-05-14 NOTE — TELEPHONE ENCOUNTER
Called patient at [phone] (W) (non-secure line) and did not leave a detailed message. Patient needs to be informed that medication order Ambien is ready for .

## 2019-11-11 ENCOUNTER — OFFICE VISIT (OUTPATIENT)
Dept: FAMILY MEDICINE CLINIC | Age: 59
End: 2019-11-11

## 2019-11-11 VITALS
HEIGHT: 70 IN | SYSTOLIC BLOOD PRESSURE: 136 MMHG | TEMPERATURE: 96.9 F | RESPIRATION RATE: 18 BRPM | WEIGHT: 189 LBS | HEART RATE: 77 BPM | OXYGEN SATURATION: 96 % | DIASTOLIC BLOOD PRESSURE: 80 MMHG | BODY MASS INDEX: 27.06 KG/M2

## 2019-11-11 DIAGNOSIS — J06.9 URI, ACUTE: Primary | ICD-10-CM

## 2019-11-11 DIAGNOSIS — R05.9 COUGH: ICD-10-CM

## 2019-11-11 DIAGNOSIS — J01.90 ACUTE NON-RECURRENT SINUSITIS, UNSPECIFIED LOCATION: ICD-10-CM

## 2019-11-11 DIAGNOSIS — R06.2 WHEEZING: ICD-10-CM

## 2019-11-11 RX ORDER — PREDNISONE 20 MG/1
40 TABLET ORAL
Qty: 10 TAB | Refills: 0 | Status: SHIPPED | OUTPATIENT
Start: 2019-11-11 | End: 2019-11-16

## 2019-11-11 RX ORDER — DOXYCYCLINE 100 MG/1
100 CAPSULE ORAL 2 TIMES DAILY
Qty: 20 CAP | Refills: 0 | Status: SHIPPED | OUTPATIENT
Start: 2019-11-11 | End: 2019-11-21

## 2019-11-11 RX ORDER — BENZONATATE 200 MG/1
200 CAPSULE ORAL
Qty: 30 CAP | Refills: 0 | Status: SHIPPED | OUTPATIENT
Start: 2019-11-11 | End: 2020-10-06

## 2019-11-11 RX ORDER — ALBUTEROL SULFATE 90 UG/1
2 AEROSOL, METERED RESPIRATORY (INHALATION)
Qty: 1 INHALER | Refills: 0 | Status: SHIPPED | OUTPATIENT
Start: 2019-11-11 | End: 2021-01-05 | Stop reason: SDUPTHER

## 2019-11-11 NOTE — PROGRESS NOTES
Deep Reveles is a  60 y.o. male presents today for office visit for congestion and coughing x 1 week. 1. Have you been to the ER, urgent care clinic or hospitalized since your last visit? NO     2. Have you seen or consulted any other health care providers outside of the [address] [place] [address] since your last visit (Include any pap smears or colon screening)?  NO

## 2019-11-11 NOTE — PROGRESS NOTES
HPI  Pt of Nila Guerra. Complaining of cough and nasal drainage for over a week. Started as runny nose, now is coughing    Says that he had Amoxicillin at home and started taking it over the weekend X 2 days. Taking OTC severe cold and flu    Says that he rarely gets sick. Past Medical History  Past Medical History:   Diagnosis Date    Atrial fibrillation ([address] 1/5/2010    Cardiac valvular malformation 1/5/2010    H/O colonoscopy 2010    f/u 10 years Dr. Antoine Vargas       Surgical History  Past Surgical History:   Procedure Laterality Date   [facility]    open heart surgery to remove a web on the left atrium    HX KNEE ARTHROSCOPY  2008    left knee meniscus tear    HX VASECTOMY  11/1/2014    REVISE ULNAR NERVE AT ELBOW  2007        Medications  Current Outpatient Medications   Medication Sig Dispense Refill    doxycycline (VIBRAMYCIN) 100 mg capsule Take 1 Cap by mouth two (2) times a day for 10 days. 20 Cap 0    albuterol (PROVENTIL HFA, VENTOLIN HFA, PROAIR HFA) 90 mcg/actuation inhaler Take 2 Puffs by inhalation every four (4) hours as needed for Wheezing or Shortness of Breath. 1 Inhaler 0    predniSONE (DELTASONE) 20 mg tablet Take 40 mg by mouth daily (with breakfast) for 5 days. 10 Tab 0    benzonatate (TESSALON) 200 mg capsule Take 1 Cap by mouth three (3) times daily as needed for Cough. 30 Cap 0    zolpidem (AMBIEN) 5 mg tablet take 1 tablet by mouth every evening for sleep. APPOINTMENT REQUIRED BEFORE NEXT REFILL. 30 Tab 0    sildenafil citrate (VIAGRA) 100 mg tablet Take 1 Tab by mouth daily as needed. 20 Tab 2    MULTIVITAMINS (MULTI-VITAMIN PO) Take  by mouth.  aspirin delayed-release (ASPIR-81) 81 mg tablet Take 81 mg by mouth daily. Allergies  Allergies   Allergen Reactions    Naprosyn [Naproxen] Other (comments)     Felt like very drowsy and near syncope.        Family History  Family History   Problem Relation Age of Onset    Heart Disease Mother     Diabetes Maternal Grandmother        Social History  Social History     Socioeconomic History    Marital status:      Spouse name: Not on file    Number of children: Not on file    Years of education: Not on file    Highest education level: Not on file   Occupational History    Occupation:    Social Needs    Financial resource strain: Not on file    Food insecurity:     Worry: Not on file     Inability: Not on file    Transportation needs:     Medical: Not on file     Non-medical: Not on file   Tobacco Use    Smoking status: Former Smoker    Smokeless tobacco: Never Used   Substance and Sexual Activity    Alcohol use:  Yes     Alcohol/week: 5.0 standard drinks     Types: 6 Glasses of wine, 2 - 3 Standard drinks or equivalent per week    Drug use: No    Sexual activity: Yes     Partners: Female     Comment: wife   Lifestyle    Physical activity:     Days per week: Not on file     Minutes per session: Not on file    Stress: Not on file   Relationships    Social connections:     Talks on phone: Not on file     Gets together: Not on file     Attends Hoahaoism service: Not on file     Active member of club or organization: Not on file     Attends meetings of clubs or organizations: Not on file     Relationship status: Not on file    Intimate partner violence:     Fear of current or ex partner: Not on file     Emotionally abused: Not on file     Physically abused: Not on file     Forced sexual activity: Not on file   Other Topics Concern   [address] Service Yes     Comment: [address] [employer]-30+ years    Blood Transfusions Not Asked    Caffeine Concern Yes     Comment: 4-5 cups a day    Occupational Exposure Not Asked   [place] Hazards Not Asked    Sleep Concern Not Asked    Stress Concern Not Asked    Weight Concern Not Asked    Special Diet Not Asked    Back Care Not Asked    Exercise Yes     Comment: yardwork once a week for several hours    Bike Helmet Not Asked    Seat Belt Yes    Self-Exams Not Asked   Social History Narrative    Safety issues/concerns: ( none)Domestic Violence, (none)Guns in home,(yes)Sunscreen, (working)Smoke Detectors, (safe)Housing. Problem List  Patient Active Problem List   Diagnosis Code    Atrial fibrillation [facility]) I48.91    Cardiac valvular malformation Q24.8    Screening for colon cancer Z12.11    Onychomycosis B35.1    Skin cancer screening Z12.83    Seasonal allergic rhinitis J30.2    Other general counseling and advice for contraceptive management Z30.09    Sterilization Z30.2    Cough R05    Wheezing R06.2    Acute URI J06.9    URI, acute J06.9    Acute non-recurrent sinusitis J01.90       Review of Systems  Review of Systems   Constitutional: Positive for diaphoresis. Negative for chills. HENT: Negative for sore throat. Clear nasal drainage, sinus pressure   Respiratory: Positive for cough, shortness of breath and wheezing. Wheezes at night   Cardiovascular: Negative. Musculoskeletal: Negative. Neurological: Positive for headaches. Vital Signs  Vitals:    11/11/19 1406   BP: 136/80   Pulse: 77   Resp: 18   Temp: 96.9 °F (36.1 °C)   TempSrc: Oral   SpO2: 96%   Weight: 189 lb (85.7 kg)   Height: 5' 10\" (1.778 m)   PainSc:   0 - No pain       Physical Exam  Physical Exam   Constitutional: He is oriented to person, place, and time. He appears well-developed and well-nourished. No distress. HENT:   Right Ear: Tympanic membrane, external ear and ear canal normal.   Left Ear: Tympanic membrane, external ear and ear canal normal.   Nose: Mucosal edema present. Right sinus exhibits no maxillary sinus tenderness and no frontal sinus tenderness. Left sinus exhibits no maxillary sinus tenderness and no frontal sinus tenderness. Mouth/Throat: Uvula is midline, oropharynx is clear and moist and mucous membranes are normal.   Neck: Normal range of motion. Neck supple.    Cardiovascular: Normal rate, regular rhythm and normal heart sounds. Pulmonary/Chest: Effort normal. No respiratory distress. He has wheezes. Very slightly dyspneic during conversation. Expiratory wheezes and coarse breath sounds noted in bilateral posterior upper and lower lung fields. Received Duo-neb in office. Tolerated this well. Continues to have scattered expiratory wheezes and coarse breath sounds. No longer dyspneic in conversation   Musculoskeletal: Normal range of motion. Lymphadenopathy:     He has no cervical adenopathy. Neurological: He is alert and oriented to person, place, and time. Skin: Skin is warm and dry. Psychiatric: He has a normal mood and affect. Nursing note and vitals reviewed. Diagnostics  Orders Placed This Encounter    doxycycline (VIBRAMYCIN) 100 mg capsule     Sig: Take 1 Cap by mouth two (2) times a day for 10 days. Dispense:  20 Cap     Refill:  0    albuterol (PROVENTIL HFA, VENTOLIN HFA, PROAIR HFA) 90 mcg/actuation inhaler     Sig: Take 2 Puffs by inhalation every four (4) hours as needed for Wheezing or Shortness of Breath. Dispense:  1 Inhaler     Refill:  0     Please dispense any brand of albuterol covered by insurance    predniSONE (DELTASONE) 20 mg tablet     Sig: Take 40 mg by mouth daily (with breakfast) for 5 days. Dispense:  10 Tab     Refill:  0    benzonatate (TESSALON) 200 mg capsule     Sig: Take 1 Cap by mouth three (3) times daily as needed for Cough.      Dispense:  30 Cap     Refill:  0       Results  Results for orders placed or performed during the hospital encounter of 05/15/17   LIPID PANEL   Result Value Ref Range    LIPID PROFILE          Cholesterol, total 183 <200 MG/DL    Triglyceride 49 <150 MG/DL    HDL Cholesterol 63 (H) 40 - 60 MG/DL    LDL, calculated 110.2 (H) 0 - 100 MG/DL    VLDL, calculated 9.8 MG/DL    CHOL/HDL Ratio 2.9 0 - 5.0     METABOLIC PANEL, BASIC   Result Value Ref Range    Sodium 138 136 - 145 mmol/L    Potassium 4.9 3.5 - 5.5 mmol/L    Chloride 104 100 - 108 mmol/L    CO2 26 21 - 32 mmol/L    Anion gap 8 3.0 - 18 mmol/L    Glucose 106 (H) 74 - 99 mg/dL    BUN 17 7.0 - 18 MG/DL    Creatinine 0.89 0.6 - 1.3 MG/DL    BUN/Creatinine ratio 19 12 - 20      GFR est AA >60 >60 ml/min/1.73m2    GFR est non-AA >60 >60 ml/min/1.73m2    Calcium 8.9 8.5 - 10.1 MG/DL   HEPATITIS C AB   Result Value Ref Range    Hepatitis C virus Ab 0.06 <0.80 Index    Hep C  virus Ab Interp. NEGATIVE  NEG      Hep C  virus Ab comment         PSA W/ REFLX FREE PSA   Result Value Ref Range    Prostate Specific Ag 0.7 0.0 - 4.0 ng/mL    Reflex Criteria Comment       Assessment and Plan  Diagnoses and all orders for this visit:    1. URI, acute  -     doxycycline (VIBRAMYCIN) 100 mg capsule; Take 1 Cap by mouth two (2) times a day for 10 days. -     albuterol (PROVENTIL HFA, VENTOLIN HFA, PROAIR HFA) 90 mcg/actuation inhaler; Take 2 Puffs by inhalation every four (4) hours as needed for Wheezing or Shortness of Breath. -     predniSONE (DELTASONE) 20 mg tablet; Take 40 mg by mouth daily (with breakfast) for 5 days. -     benzonatate (TESSALON) 200 mg capsule; Take 1 Cap by mouth three (3) times daily as needed for Cough. Increase fluid intake, monitor for fever, saline nasal spray, gargle with salt water. 2. Acute non-recurrent sinusitis, unspecified location  -     doxycycline (VIBRAMYCIN) 100 mg capsule; Take 1 Cap by mouth two (2) times a day for 10 days. As above. 3. Wheezing    4. Cough        After care summary printed and reviewed with patient. Plan reviewed with patient. Questions answered. Patient verbalized understanding of plan and is in agreement with plan. Patient to follow up in one week  if symptoms worsen/ do not improve. Encouraged the use of my chart.     Liliana Henderson, FNP-C

## 2019-11-19 ENCOUNTER — OFFICE VISIT (OUTPATIENT)
Dept: FAMILY MEDICINE CLINIC | Age: 59
End: 2019-11-19

## 2019-11-19 VITALS
SYSTOLIC BLOOD PRESSURE: 111 MMHG | HEART RATE: 85 BPM | WEIGHT: 187.8 LBS | BODY MASS INDEX: 26.88 KG/M2 | HEIGHT: 70 IN | TEMPERATURE: 98.1 F | OXYGEN SATURATION: 96 % | DIASTOLIC BLOOD PRESSURE: 75 MMHG | RESPIRATION RATE: 18 BRPM

## 2019-11-19 DIAGNOSIS — G47.00 INSOMNIA, UNSPECIFIED TYPE: ICD-10-CM

## 2019-11-19 DIAGNOSIS — B35.1 FUNGAL INFECTION OF TOENAIL: Primary | ICD-10-CM

## 2019-11-19 DIAGNOSIS — Z23 ENCOUNTER FOR IMMUNIZATION: ICD-10-CM

## 2019-11-19 RX ORDER — ZOLPIDEM TARTRATE 5 MG/1
TABLET ORAL
Qty: 30 TAB | Refills: 0 | Status: SHIPPED | OUTPATIENT
Start: 2019-11-19 | End: 2020-10-06 | Stop reason: SDUPTHER

## 2019-11-19 RX ORDER — TERBINAFINE HYDROCHLORIDE 250 MG/1
250 TABLET ORAL DAILY
Qty: 90 TAB | Refills: 0 | Status: SHIPPED | OUTPATIENT
Start: 2019-11-19 | End: 2020-10-06 | Stop reason: ALTCHOICE

## 2019-11-19 RX ORDER — SILDENAFIL 100 MG/1
100 TABLET, FILM COATED ORAL
Qty: 20 TAB | Refills: 2 | Status: SHIPPED | OUTPATIENT
Start: 2019-11-19 | End: 2020-10-06 | Stop reason: SDUPTHER

## 2019-11-19 NOTE — PROGRESS NOTES
Patient here for cold symptoms and toe nail discoloration left foot great toe. Flu shot given, patient tolerate well. No S/S of adverse reaction noted as this time. 1. Have you been to the ER, urgent care clinic since your last visit? Hospitalized since your last visit? No    2. Have you seen or consulted any other health care providers outside of the [address] [place] [address] since your last visit? Include any pap smears or colon screening.  Dentist Dr Bailey Mak

## 2019-11-19 NOTE — PROGRESS NOTES
HISTORY OF PRESENT ILLNESS Markos Farris is a 60 y.o. male. Cold Symptoms Other ROS Physical Exam 
 
ASSESSMENT and PLAN 
{ASSESSMENT/PLAN:40076}

## 2019-11-23 NOTE — PROGRESS NOTES
Natali Gallegos is a 60 y.o.  male and presents with Cold Symptoms and Other (Toe nail discoloration left foot great toe.)      SUBJECTIVE:    Patient presents with discoloration of his toenail on his left great toe. He has a history of fungal infection in this toenail that was treated with Lamisil by podiatry (1 foot to foot) was educated that this is a condition that can recur due to the warm environment and moist environment of constantly being in shoes. We will go ahead and try and treat again with Lamisil unless patient insurance needs biopsy of the toenail in which case he will have to go back to podiatry. We will check LFTs today. Patient's initial cold symptoms which he came in for resolving. Respiratory ROS: negative for - shortness of breath  Cardiovascular ROS: negative for - chest pain    Current Outpatient Medications   Medication Sig    zolpidem (AMBIEN) 5 mg tablet take 1 tablet by mouth every evening for sleep. APPOINTMENT REQUIRED BEFORE NEXT REFILL.  sildenafil citrate (VIAGRA) 100 mg tablet Take 1 Tab by mouth daily as needed (ED).  terbinafine HCl (LAMISIL) 250 mg tablet Take 1 Tab by mouth daily. Indications: fungal infection of toenail    albuterol (PROVENTIL HFA, VENTOLIN HFA, PROAIR HFA) 90 mcg/actuation inhaler Take 2 Puffs by inhalation every four (4) hours as needed for Wheezing or Shortness of Breath.  benzonatate (TESSALON) 200 mg capsule Take 1 Cap by mouth three (3) times daily as needed for Cough.  MULTIVITAMINS (MULTI-VITAMIN PO) Take  by mouth.  aspirin delayed-release (ASPIR-81) 81 mg tablet Take 81 mg by mouth daily. No current facility-administered medications for this visit.           OBJECTIVE:  alert, well appearing, and in no distress  Visit Vitals  /75 (BP 1 Location: Left arm, BP Patient Position: Sitting)   Pulse 85   Temp 98.1 °F (36.7 °C) (Oral)   Resp 18   Ht 5' 10\" (1.778 m)   Wt 187 lb 12.8 oz (85.2 kg)   SpO2 96%   BMI 26.95 kg/m² well developed and well nourished  Skin - NAILS: Thick dystrophic toenail on great toe of left foot    Labs:   Lab Results   Component Value Date/Time    Sodium 138 05/15/2017 08:15 AM    Potassium 4.9 05/15/2017 08:15 AM    Chloride 104 05/15/2017 08:15 AM    CO2 26 05/15/2017 08:15 AM    Anion gap 8 05/15/2017 08:15 AM    Glucose 106 (H) 05/15/2017 08:15 AM    BUN 17 05/15/2017 08:15 AM    Creatinine 0.89 05/15/2017 08:15 AM    BUN/Creatinine ratio 19 05/15/2017 08:15 AM    GFR est AA >60 05/15/2017 08:15 AM    GFR est non-AA >60 05/15/2017 08:15 AM    Calcium 8.9 05/15/2017 08:15 AM    Bilirubin, total 0.9 05/28/2010 03:56 PM    ALT (SGPT) 41 05/28/2010 03:56 PM    AST (SGOT) 19 05/28/2010 03:56 PM    Alk. phosphatase 72 05/28/2010 03:56 PM    Protein, total 7.5 05/28/2010 03:56 PM    Albumin 4.7 05/28/2010 03:56 PM    Globulin 2.8 05/28/2010 03:56 PM    A-G Ratio 1.7 05/28/2010 03:56 PM          Assessment/Plan      ICD-10-CM ICD-9-CM    1. Fungal infection of toenail J55.4 703.2 METABOLIC PANEL, COMPREHENSIVE      terbinafine HCl (LAMISIL) 250 mg tablet   2. Insomnia, unspecified type G47.00 780.52 zolpidem (AMBIEN) 5 mg tablet   3. Encounter for immunization Z23 V03.89 INFLUENZA VIRUS VAC QUAD,SPLIT,PRESV FREE SYRINGE IM      MI IMMUNIZ ADMIN,1 SINGLE/COMB VAC/TOXOID     Follow-up and Dispositions    · Return if symptoms worsen or fail to improve. Reviewed plan of care. Patient has provided input and agrees with goals.

## 2020-07-02 ENCOUNTER — TELEPHONE (OUTPATIENT)
Dept: FAMILY MEDICINE CLINIC | Age: 60
End: 2020-07-02

## 2020-09-16 ENCOUNTER — TELEPHONE (OUTPATIENT)
Dept: FAMILY MEDICINE CLINIC | Age: 60
End: 2020-09-16

## 2020-09-16 DIAGNOSIS — G47.33 OBSTRUCTIVE SLEEP APNEA OF ADULT: Primary | ICD-10-CM

## 2020-09-16 NOTE — TELEPHONE ENCOUNTER
Patient was waiting of referral  For Neurology , Had an appointment today but does not show a referral at all.   Request a call back  [phone]

## 2020-09-16 NOTE — TELEPHONE ENCOUNTER
Pt states he called & gave information for a needed  referral, however there is no note in his chart. [facility] [address]  Ph# [phone]  Fax# [phone]    Dr Juan Harman   G47.3    Pt has appt today at 2:45pm but Pt states he is going to cancel & reschedule. Apologized to Pt concerning the referral & Pt was very understanding.

## 2020-10-06 ENCOUNTER — OFFICE VISIT (OUTPATIENT)
Dept: FAMILY MEDICINE CLINIC | Age: 60
End: 2020-10-06
Payer: OTHER GOVERNMENT

## 2020-10-06 VITALS
BODY MASS INDEX: 26.92 KG/M2 | HEIGHT: 70 IN | HEART RATE: 74 BPM | OXYGEN SATURATION: 95 % | WEIGHT: 188 LBS | SYSTOLIC BLOOD PRESSURE: 118 MMHG | DIASTOLIC BLOOD PRESSURE: 73 MMHG | TEMPERATURE: 97.4 F | RESPIRATION RATE: 18 BRPM

## 2020-10-06 DIAGNOSIS — Z00.00 WELL ADULT EXAM: Primary | ICD-10-CM

## 2020-10-06 DIAGNOSIS — G47.00 INSOMNIA, UNSPECIFIED TYPE: ICD-10-CM

## 2020-10-06 DIAGNOSIS — Z13.6 SCREENING FOR CARDIOVASCULAR CONDITION: ICD-10-CM

## 2020-10-06 DIAGNOSIS — Z12.11 SCREENING FOR COLON CANCER: ICD-10-CM

## 2020-10-06 DIAGNOSIS — Z23 NEEDS FLU SHOT: ICD-10-CM

## 2020-10-06 DIAGNOSIS — Z12.5 SCREENING FOR PROSTATE CANCER: ICD-10-CM

## 2020-10-06 DIAGNOSIS — H40.003 GLAUCOMA SUSPECT OF BOTH EYES: ICD-10-CM

## 2020-10-06 PROCEDURE — 90686 IIV4 VACC NO PRSV 0.5 ML IM: CPT | Performed by: FAMILY MEDICINE

## 2020-10-06 PROCEDURE — 99396 PREV VISIT EST AGE 40-64: CPT | Performed by: FAMILY MEDICINE

## 2020-10-06 PROCEDURE — 90471 IMMUNIZATION ADMIN: CPT | Performed by: FAMILY MEDICINE

## 2020-10-06 RX ORDER — SILDENAFIL 100 MG/1
100 TABLET, FILM COATED ORAL
Qty: 20 TAB | Refills: 2 | Status: SHIPPED | OUTPATIENT
Start: 2020-10-06

## 2020-10-06 RX ORDER — ZOLPIDEM TARTRATE 5 MG/1
TABLET ORAL
Qty: 30 TAB | Refills: 3 | Status: SHIPPED | OUTPATIENT
Start: 2020-10-06 | End: 2021-04-11

## 2020-10-06 NOTE — PROGRESS NOTES
Barbara Jung presents today for No chief complaint on file. Is someone accompanying this pt? no    Is the patient using any DME equipment during [address]? no    Depression Screening:  3 most recent PHQ Screens 10/6/2020   Little interest or pleasure in doing things Not at all   Feeling down, depressed, irritable, or hopeless Not at all   Total Score PHQ 2 0       Learning Assessment:  Learning Assessment 3/19/2015   PRIMARY LEARNER Patient   PRIMARY LANGUAGE ENGLISH   LEARNER PREFERENCE PRIMARY DEMONSTRATION     LISTENING     READING   ANSWERED BY patient   RELATIONSHIP SELF       Health Maintenance reviewed and discussed and ordered per Provider. Health Maintenance Due   Topic Date Due    Shingrix Vaccine Age 49> (1 of 2) 12/22/2009    Flu Vaccine (1) 09/01/2020    Colonoscopy  10/06/2020   . Coordination of Care:  1. Have you been to the ER, urgent care clinic since your last visit?no Hospitalized since your last visit? no    2. Have you seen or consulted any other health care providers outside of the [address] [place] [address] since your last visit? Include any pap smears or colon screening. vaishnavi Jung is a 60 y.o. male who presents for routine immunizations. He denies any symptoms , reactions or allergies that would exclude them from being immunized today. Risks and adverse reactions were discussed and the VIS was given to them. All questions were addressed. He was observed for 15 min post injection. There were no reactions observed. Verbal order for Flu vaccination IM received per Lexi Mcdaniel MD for pt Barbara Jung. Order written down and read back to provider for verification prior to completion.

## 2020-10-06 NOTE — PATIENT INSTRUCTIONS
Well Visit, Men 48 to 72: Care Instructions  Your Care Instructions     Physical exams can help you stay healthy. Your doctor has checked your overall health and may have suggested ways to take good care of yourself. He or she also may have recommended tests. At home, you can help prevent illness with healthy eating, regular exercise, and other steps. Follow-up care is a key part of your treatment and safety. Be sure to make and go to all appointments, and call your doctor if you are having problems. It's also a good idea to know your test results and keep a list of the medicines you take. How can you care for yourself at home? · Reach and stay at a healthy weight. This will lower your risk for many problems, such as obesity, diabetes, heart disease, and high blood pressure. · Get at least 30 minutes of exercise on most days of the week. Walking is a good choice. You also may want to do other activities, such as running, swimming, cycling, or playing tennis or team sports. · Do not smoke. Smoking can make health problems worse. If you need help quitting, talk to your doctor about stop-smoking programs and medicines. These can increase your chances of quitting for good. · Protect your skin from too much sun. When you're outdoors from 10 a.m. to 4 p.m., stay in the shade or cover up with clothing and a hat with a wide brim. Wear sunglasses that block UV rays. Even when it's cloudy, put broad-spectrum sunscreen (SPF 30 or higher) on any exposed skin. · See a dentist one or two times a year for checkups and to have your teeth cleaned. · Wear a seat belt in the car. Follow your doctor's advice about when to have certain tests. These tests can spot problems early. · Cholesterol. Your doctor will tell you how often to have this done based on your overall health and other things that can increase your risk for heart attack and stroke. · Blood pressure.  Have your blood pressure checked during a routine doctor visit. Your doctor will tell you how often to check your blood pressure based on your age, your blood pressure results, and other factors. · Prostate exam. Talk to your doctor about whether you should have a blood test (called a PSA test) for prostate cancer. Experts recommend that you discuss the benefits and risks of the test with your doctor before you decide whether to have this test.  · Diabetes. Ask your doctor whether you should have tests for diabetes. · Vision. Some experts recommend that you have yearly exams for glaucoma and other age-related eye problems starting at age 48. · Hearing. Tell your doctor if you notice any change in your hearing. You can have tests to find out how well you hear. · Colorectal cancer. Your risk for colorectal cancer gets higher as you get older. Some experts say that adults should start regular screening at age 48 and stop at age 76. Others say to start before age 48 or continue after age 76. Talk with your doctor about your risk and when to start and stop screening. · Heart attack and stroke risk. At least every 4 to 6 years, you should have your risk for heart attack and stroke assessed. Your doctor uses factors such as your age, blood pressure, cholesterol, and whether you smoke or have diabetes to show what your risk for a heart attack or stroke is over the next 10 years. · Abdominal aortic aneurysm. Ask your doctor whether you should have a test to check for an aneurysm. You may need a test if you ever smoked or if your parent, brother, sister, or child has had an aneurysm. When should you call for help? Watch closely for changes in your health, and be sure to contact your doctor if you have any problems or symptoms that concern you. Where can you learn more? Go to http://www.gray.com/  Enter H422 in the search box to learn more about \"Well Visit, Men 48 to 72: Care Instructions. \"  Current as of: May 27, 2020               Content Version: 12.6  © 2630-4636 [employer]. Care instructions adapted under license by [employer] (which disclaims liability or warranty for this information). If you have questions about a medical condition or this instruction, always ask your healthcare professional. [address] any warranty or liability for your use of this information.

## 2020-10-09 ENCOUNTER — HOSPITAL ENCOUNTER (OUTPATIENT)
Dept: LAB | Age: 60
Discharge: HOME | End: 2020-10-09
Payer: OTHER GOVERNMENT

## 2020-10-09 DIAGNOSIS — Z12.5 SCREENING FOR PROSTATE CANCER: ICD-10-CM

## 2020-10-09 DIAGNOSIS — Z13.6 SCREENING FOR CARDIOVASCULAR CONDITION: ICD-10-CM

## 2020-10-09 LAB
ALBUMIN SERPL-MCNC: 4.3 G/DL (ref 3.4–5)
ALBUMIN/GLOB SERPL: 1.3 {RATIO} (ref 0.8–1.7)
ALP SERPL-CCNC: 58 U/L (ref 45–117)
ALT SERPL-CCNC: 56 U/L (ref 16–61)
ANION GAP SERPL CALC-SCNC: 7 MMOL/L (ref 3–18)
AST SERPL-CCNC: 29 U/L (ref 10–38)
BILIRUB SERPL-MCNC: 0.9 MG/DL (ref 0.2–1)
BUN SERPL-MCNC: 15 MG/DL (ref 7–18)
BUN/CREAT SERPL: 16 (ref 12–20)
CALCIUM SERPL-MCNC: 9 MG/DL (ref 8.5–10.1)
CHLORIDE SERPL-SCNC: 109 MMOL/L (ref 100–111)
CHOLEST SERPL-MCNC: 194 MG/DL
CO2 SERPL-SCNC: 26 MMOL/L (ref 21–32)
CREAT SERPL-MCNC: 0.92 MG/DL (ref 0.6–1.3)
GLOBULIN SER CALC-MCNC: 3.2 G/DL (ref 2–4)
GLUCOSE SERPL-MCNC: 101 MG/DL (ref 74–99)
HDLC SERPL-MCNC: 59 MG/DL (ref 40–60)
HDLC SERPL: 3.3 {RATIO} (ref 0–5)
LDLC SERPL CALC-MCNC: 122.4 MG/DL (ref 0–100)
LIPID PROFILE,FLP: ABNORMAL
POTASSIUM SERPL-SCNC: 4.8 MMOL/L (ref 3.5–5.5)
PROT SERPL-MCNC: 7.5 G/DL (ref 6.4–8.2)
SODIUM SERPL-SCNC: 142 MMOL/L (ref 136–145)
TRIGL SERPL-MCNC: 63 MG/DL (ref ?–150)
VLDLC SERPL CALC-MCNC: 12.6 MG/DL

## 2020-10-09 PROCEDURE — 80061 LIPID PANEL: CPT

## 2020-10-09 PROCEDURE — 36415 COLL VENOUS BLD VENIPUNCTURE: CPT

## 2020-10-09 PROCEDURE — 80053 COMPREHEN METABOLIC PANEL: CPT

## 2020-10-09 PROCEDURE — 84153 ASSAY OF PSA TOTAL: CPT

## 2020-10-11 PROBLEM — G47.00 INSOMNIA: Status: ACTIVE | Noted: 2020-10-11

## 2020-10-11 PROBLEM — H40.003 GLAUCOMA SUSPECT OF BOTH EYES: Status: ACTIVE | Noted: 2020-10-11

## 2020-10-11 LAB
PSA SERPL-MCNC: 0.5 NG/ML (ref 0–4)
REFLEX CRITERIA: NORMAL

## 2020-10-11 NOTE — PROGRESS NOTES
Subjective:     Brice Vital is a 60 y.o. male presenting for annual exam and complete physical.  He has been well;   Has insomnia; needs a refill on [place]; med helps; Has no sx of urinary hesitancy, dribbling or dysuria; he agrees to PSA blood testing  Has ED ; uses Viagra; med helps and he tolerates med well. Needs a referral to ophthalmology; he is a glaucoma suspect pt. He has chronic Afib; this is stable; has seen Cardiology recently. Patient Active Problem List    Diagnosis Date Noted    Glaucoma suspect of both eyes 10/11/2020    Insomnia 10/11/2020    Cough 11/11/2019    Wheezing 11/11/2019    Acute URI 11/11/2019    URI, acute 11/11/2019    Acute non-recurrent sinusitis 11/11/2019    Sterilization 11/14/2014    Other general counseling and advice for contraceptive management 09/08/2014    Seasonal allergic rhinitis 04/19/2012    Skin cancer screening 10/06/2010    Onychomycosis 05/28/2010    Screening for colon cancer 03/23/2010    Atrial fibrillation ([facility] [address] 01/05/2010    Cardiac valvular malformation 01/05/2010     Current Outpatient Medications   Medication Sig Dispense Refill    zolpidem (AMBIEN) 5 mg tablet take 1 tablet by mouth every evening for sleep. 30 Tab 3    sildenafil citrate (Viagra) 100 mg tablet Take 1 Tab by mouth daily as needed (ED). 20 Tab 2    MULTIVITAMINS (MULTI-VITAMIN PO) Take  by mouth.  aspirin delayed-release (ASPIR-81) 81 mg tablet Take 81 mg by mouth daily.  albuterol (PROVENTIL HFA, VENTOLIN HFA, PROAIR HFA) 90 mcg/actuation inhaler Take 2 Puffs by inhalation every four (4) hours as needed for Wheezing or Shortness of Breath. 1 Inhaler 0     Allergies   Allergen Reactions    Naprosyn [Naproxen] Other (comments)     Felt like very drowsy and near syncope.      Past Medical History:   Diagnosis Date    Atrial fibrillation ([facility] [address] 1/5/2010    Cardiac valvular malformation 1/5/2010    H/O colonoscopy 2010    f/u 10 years Dr. Rukhsana Chaudhari [place]     Past Surgical History:   Procedure Laterality Date    CARDIAC SURG PROCEDURE UNLIST  1988    open heart surgery to remove a web on the left atrium    HX KNEE ARTHROSCOPY  2008    left knee meniscus tear    HX VASECTOMY  11/1/2014    HI REVISE ULNAR NERVE AT ELBOW  2007     Family History   Problem Relation Age of Onset    Heart Disease Mother     Diabetes Maternal Grandmother      Social History     Tobacco Use    Smoking status: Former Smoker    Smokeless tobacco: Never Used   Substance Use Topics    Alcohol use: Yes     Alcohol/week: 5.0 standard drinks     Types: 6 Glasses of wine, 2 - 3 Standard drinks or equivalent per week        Last labs reviewed     Review of Systems  A comprehensive review of systems was negative except for that written in the HPI. Objective:     Visit Vitals  /73   Pulse 74   Temp 97.4 °F (36.3 °C) (Temporal)   Resp 18   Ht 5' 10\" (1.778 m)   Wt 188 lb (85.3 kg)   SpO2 95%   BMI 26.98 kg/m²     Physical exam:   General appearance - alert, well appearing, and in no distress  Ears - bilateral TM's and external ear canals normal  Neck - supple, no significant adenopathy  Lymphatics - no palpable lymphadenopathy, no hepatosplenomegaly  Chest - clear to auscultation, no wheezes, rales or rhonchi, symmetric air entry  Heart - irregularly, irregular, controlled rate. Abdomen - soft, nontender, nondistended, no masses or organomegaly  Musculoskeletal - no joint tenderness, deformity or swelling  Extremities - no pedal edema noted  Skin - normal coloration and turgor, no rashes, no suspicious skin lesions noted       Assessment/Plan         ICD-10-CM ICD-9-CM    1. Well adult exam  Z00.00 V70.0    2. Insomnia, unspecified type  G47.00 780.52 zolpidem (AMBIEN) 5 mg tablet   3. Needs flu shot  Z23 V04.81 INFLUENZA VIRUS VAC QUAD,SPLIT,PRESV FREE SYRINGE IM   4. Screening for cardiovascular condition  Z13.6 V81.2 LIPID PANEL      METABOLIC PANEL, COMPREHENSIVE   5. Screening for colon cancer  Z12.11 V76.51 REFERRAL TO GASTROENTEROLOGY   6. Screening for prostate cancer  Z12.5 V76.44 PSA W/ REFLX FREE PSA   7. Glaucoma suspect of both eyes  H40.003 365.00 REFERRAL TO OPHTHALMOLOGY   . As above,   above all stable unless otherwise noted   treatment plan as listed below  Orders Placed This Encounter    Influenza Virus Vaccine QUAD, PF Syr 6 Months + (Flulaval, Fluzone, Fluarix [id number])    PSA W/ REFLX FREE PSA    LIPID PANEL    METABOLIC PANEL, COMPREHENSIVE    REFERRAL TO OPHTHALMOLOGY    REFERRAL TO GASTROENTEROLOGY    zolpidem (AMBIEN) 5 mg tablet    sildenafil citrate (Viagra) 100 mg tablet     Follow-up and Dispositions    · Return in about 1 year (around 10/6/2021) for well exam.       This has been fully explained to the patient, who indicates understanding.

## 2020-10-23 ENCOUNTER — TELEPHONE (OUTPATIENT)
Dept: FAMILY MEDICINE CLINIC | Age: 60
End: 2020-10-23

## 2020-10-23 NOTE — TELEPHONE ENCOUNTER
Pt called to check status of referrals for colon and glaucoma screenings entered 10/6/20, adv pending auth. Pt states has already scheduled appts for just over a week from now.  Please adv [phone]

## 2020-10-29 ENCOUNTER — TELEPHONE (OUTPATIENT)
Dept: FAMILY MEDICINE CLINIC | Age: 60
End: 2020-10-29

## 2020-10-29 NOTE — TELEPHONE ENCOUNTER
Patient walked into clinic wanting to know the status of his 2 referrals. Patient was upset that they were not processed.  Please advise

## 2020-12-30 ENCOUNTER — TELEPHONE (OUTPATIENT)
Dept: FAMILY MEDICINE CLINIC | Age: 60
End: 2020-12-30

## 2020-12-30 NOTE — TELEPHONE ENCOUNTER
Patient states that he has been coughing with bouts of diarrhea and headaches for four days. Patient would like to be tested for Covid.

## 2020-12-30 NOTE — TELEPHONE ENCOUNTER
Left name and call back number. The call was to inform  patient that he is scheduled for a Covid 19 test tomorrow 12- @ 9:00am.    Location: [facility]                  [address] [place] [address]., [address]                  Phone [phone]

## 2020-12-31 ENCOUNTER — CLINICAL SUPPORT (OUTPATIENT)
Dept: FAMILY MEDICINE CLINIC | Age: 60
End: 2020-12-31

## 2020-12-31 ENCOUNTER — HOSPITAL ENCOUNTER (OUTPATIENT)
Dept: LAB | Age: 60
Discharge: HOME | End: 2020-12-31
Payer: OTHER GOVERNMENT

## 2020-12-31 DIAGNOSIS — R05.9 COUGH: ICD-10-CM

## 2020-12-31 DIAGNOSIS — R19.7 DIARRHEA, UNSPECIFIED TYPE: Primary | ICD-10-CM

## 2020-12-31 DIAGNOSIS — R19.7 DIARRHEA, UNSPECIFIED TYPE: ICD-10-CM

## 2020-12-31 PROCEDURE — 87635 SARS-COV-2 COVID-19 AMP PRB: CPT

## 2021-01-02 LAB — SARS-COV-2, COV2NT: NOT DETECTED

## 2021-01-05 ENCOUNTER — VIRTUAL VISIT (OUTPATIENT)
Dept: FAMILY MEDICINE CLINIC | Age: 61
End: 2021-01-05
Payer: OTHER GOVERNMENT

## 2021-01-05 DIAGNOSIS — J06.9 URI, ACUTE: ICD-10-CM

## 2021-01-05 PROCEDURE — 99213 OFFICE O/P EST LOW 20 MIN: CPT | Performed by: NURSE PRACTITIONER

## 2021-01-05 RX ORDER — GUAIFENESIN 100 MG/5ML
200 SOLUTION ORAL
Qty: 1 BOTTLE | Refills: 0 | Status: SHIPPED | OUTPATIENT
Start: 2021-01-05 | End: 2021-02-15 | Stop reason: ALTCHOICE

## 2021-01-05 RX ORDER — AMOXICILLIN 875 MG/1
875 TABLET, FILM COATED ORAL 2 TIMES DAILY
Qty: 20 TAB | Refills: 0 | Status: SHIPPED | OUTPATIENT
Start: 2021-01-05 | End: 2021-01-15

## 2021-01-05 RX ORDER — ALBUTEROL SULFATE 90 UG/1
2 AEROSOL, METERED RESPIRATORY (INHALATION)
Qty: 1 INHALER | Refills: 0 | Status: SHIPPED | OUTPATIENT
Start: 2021-01-05

## 2021-01-05 NOTE — PROGRESS NOTES
Abhishek Mart is a 63 y.o. male who was seen by synchronous (real-time) audio-video technology on 1/5/2021 for URI    Pt is being evaluated for 2 weeks of dry coughing, chest congestion, watery eyes, runny nose, PND. Pt denies any headache, sinus pain, abd. pain, or loss of taste or smell. Pt was tested for COVID on the 31st and results are negative. Pt states he rarely gets sick but it had been 2 weeks so he thought to reach out. Assessment & Plan:   Diagnoses and all orders for this visit:    1. URI, acute  -     albuterol (PROVENTIL HFA, VENTOLIN HFA, PROAIR HFA) 90 mcg/actuation inhaler; Take 2 Puffs by inhalation every four (4) hours as needed for Wheezing or Shortness of Breath.  -     guaiFENesin (Tussin Chest Congestion) 100 mg/5 mL liquid; Take 10 mL by mouth three (3) times daily as needed for Cough. -     amoxicillin (AMOXIL) 875 mg tablet; Take 1 Tab by mouth two (2) times a day for 10 days. Follow-up and Dispositions    · Return if symptoms worsen or fail to improve. 712  Subjective:       Prior to Admission medications    Medication Sig Start Date End Date Taking? Authorizing Provider   albuterol (PROVENTIL HFA, VENTOLIN HFA, PROAIR HFA) 90 mcg/actuation inhaler Take 2 Puffs by inhalation every four (4) hours as needed for Wheezing or Shortness of Breath. 1/5/21  Yes Cristofer Chaidez NP   guaiFENesin (Tussin Chest Congestion) 100 mg/5 mL liquid Take 10 mL by mouth three (3) times daily as needed for Cough. 1/5/21  Yes Cristofer Chaidez NP   amoxicillin (AMOXIL) 875 mg tablet Take 1 Tab by mouth two (2) times a day for 10 days. 1/5/21 1/15/21 Yes Cristofer Chaidez NP   zolpidem (AMBIEN) 5 mg tablet take 1 tablet by mouth every evening for sleep. 10/6/20  Yes Efren Rodriguez MD   sildenafil citrate (Viagra) 100 mg tablet Take 1 Tab by mouth daily as needed (ED). 10/6/20  Yes Efren Rodriguez MD   MULTIVITAMINS (MULTI-VITAMIN PO) Take  by mouth.    Yes Provider, Historical   aspirin delayed-release (ASPIR-81) 81 mg tablet Take 81 mg by mouth daily. Yes Provider, Historical   albuterol (PROVENTIL HFA, VENTOLIN HFA, PROAIR HFA) 90 mcg/actuation inhaler Take 2 Puffs by inhalation every four (4) hours as needed for Wheezing or Shortness of Breath. 11/11/19 1/5/21  Boyd Santacruz NP     Patient Active Problem List   Diagnosis Code    Atrial fibrillation [facility]) I48.91    Cardiac valvular malformation Q24.8    Screening for colon cancer Z12.11    Onychomycosis B35.1    Skin cancer screening Z12.83    Seasonal allergic rhinitis J30.2    Other general counseling and advice for contraceptive management Z30.09    Sterilization Z30.2    Cough R05    Wheezing R06.2    Acute URI J06.9    URI, acute J06.9    Acute non-recurrent sinusitis J01.90    Glaucoma suspect of both eyes H40.003    Insomnia G47.00     Current Outpatient Medications   Medication Sig Dispense Refill    albuterol (PROVENTIL HFA, VENTOLIN HFA, PROAIR HFA) 90 mcg/actuation inhaler Take 2 Puffs by inhalation every four (4) hours as needed for Wheezing or Shortness of Breath. 1 Inhaler 0    guaiFENesin (Tussin Chest Congestion) 100 mg/5 mL liquid Take 10 mL by mouth three (3) times daily as needed for Cough. 1 Bottle 0    amoxicillin (AMOXIL) 875 mg tablet Take 1 Tab by mouth two (2) times a day for 10 days. 20 Tab 0    zolpidem (AMBIEN) 5 mg tablet take 1 tablet by mouth every evening for sleep. 30 Tab 3    sildenafil citrate (Viagra) 100 mg tablet Take 1 Tab by mouth daily as needed (ED). 20 Tab 2    MULTIVITAMINS (MULTI-VITAMIN PO) Take  by mouth.  aspirin delayed-release (ASPIR-81) 81 mg tablet Take 81 mg by mouth daily. Allergies   Allergen Reactions    Naprosyn [Naproxen] Other (comments)     Felt like very drowsy and near syncope. Review of Systems   Constitutional: Negative for chills and fever. HENT: Positive for congestion and sore throat.     Respiratory: Positive for cough and wheezing. Negative for sputum production. Objective:   No flowsheet data found. General: alert, cooperative, no distress   Mental  status: normal mood, behavior, speech, dress, motor activity, and thought processes, able to follow commands   HENT: NCAT   Neck: no visualized mass   Resp: no respiratory distress   Neuro: no gross deficits   Skin: no discoloration or lesions of concern on visible areas   Psychiatric: normal affect, consistent with stated mood, no evidence of hallucinations     Additional exam findings: N/A      We discussed the expected course, resolution and complications of the diagnosis(es) in detail. Medication risks, benefits, costs, interactions, and alternatives were discussed as indicated. I advised him to contact the office if his condition worsens, changes or fails to improve as anticipated. He expressed understanding with the diagnosis(es) and plan. Niecy Donis, who was evaluated through a patient-initiated, synchronous (real-time) audio-video encounter, and/or his healthcare decision maker, is aware that it is a billable service, with coverage as determined by his insurance carrier. He provided verbal consent to proceed: Yes, and patient identification was verified. It was conducted pursuant to the emergency declaration under the [address] [place] [address] authority and the [employer] General Act. A caregiver was present when appropriate. Ability to conduct physical exam was limited. I was in the office. The patient was at home.       Presley Farley NP

## 2021-01-13 ENCOUNTER — VIRTUAL VISIT (OUTPATIENT)
Dept: FAMILY MEDICINE CLINIC | Age: 61
End: 2021-01-13
Payer: OTHER GOVERNMENT

## 2021-01-13 ENCOUNTER — TELEPHONE (OUTPATIENT)
Dept: FAMILY MEDICINE CLINIC | Age: 61
End: 2021-01-13

## 2021-01-13 DIAGNOSIS — R05.9 COUGH: Primary | ICD-10-CM

## 2021-01-13 DIAGNOSIS — R07.89 CHEST TIGHTNESS: ICD-10-CM

## 2021-01-13 DIAGNOSIS — R06.2 WHEEZING: ICD-10-CM

## 2021-01-13 PROCEDURE — 99214 OFFICE O/P EST MOD 30 MIN: CPT | Performed by: NURSE PRACTITIONER

## 2021-01-13 RX ORDER — CETIRIZINE HCL 10 MG
10 TABLET ORAL 2 TIMES DAILY
Qty: 180 TAB | Refills: 0 | Status: SHIPPED | OUTPATIENT
Start: 2021-01-13 | End: 2021-04-11 | Stop reason: SDUPTHER

## 2021-01-13 RX ORDER — MONTELUKAST SODIUM 10 MG/1
10 TABLET ORAL DAILY
Qty: 90 TAB | Refills: 0 | Status: SHIPPED | OUTPATIENT
Start: 2021-01-13 | End: 2021-04-11 | Stop reason: SDUPTHER

## 2021-01-13 NOTE — PROGRESS NOTES
Matthew Head is a 63 y.o. male who was seen by synchronous (real-time) audio-video technology on 1/13/2021 for Cough, Wheezing, and Shortness of Breath    Pt is being evaluated for what he believes to be an allergic reaction to cats. His wife brought home some rescue cats a few weeks ago, and that's when his symptoms started. He wast reated on 1/5/21 for URI bit his symptoms have not subsided. He is still c/o cough, wheezing, tightness of the chest. This is mostly at night. Pt does not have a history of asthma. Assessment & Plan:   Diagnoses and all orders for this visit:    1. Cough  -     REFERRAL TO ALLERGY  -     cetirizine (ZYRTEC) 10 mg tablet; Take 1 Tab by mouth two (2) times a day. 2. Wheezing  -     REFERRAL TO ALLERGY  -     montelukast (SINGULAIR) 10 mg tablet; Take 1 Tab by mouth daily. -     cetirizine (ZYRTEC) 10 mg tablet; Take 1 Tab by mouth two (2) times a day. 3. Chest tightness  -     montelukast (SINGULAIR) 10 mg tablet; Take 1 Tab by mouth daily. -     cetirizine (ZYRTEC) 10 mg tablet; Take 1 Tab by mouth two (2) times a day. Follow-up and Dispositions    · Return in about 1 month (around 2/13/2021) for Cough, Wheezing, and Chest tightness (VV). 12  Subjective:       Prior to Admission medications    Medication Sig Start Date End Date Taking? Authorizing Provider   montelukast (SINGULAIR) 10 mg tablet Take 1 Tab by mouth daily. 1/13/21  Yes Juli Enrique NP   cetirizine (ZYRTEC) 10 mg tablet Take 1 Tab by mouth two (2) times a day. 1/13/21  Yes Juli Enrique NP   albuterol (PROVENTIL HFA, VENTOLIN HFA, PROAIR HFA) 90 mcg/actuation inhaler Take 2 Puffs by inhalation every four (4) hours as needed for Wheezing or Shortness of Breath. 1/5/21   Juli Enrique NP   guaiFENesin (Tussin Chest Congestion) 100 mg/5 mL liquid Take 10 mL by mouth three (3) times daily as needed for Cough.  1/5/21   Juli Enrique NP   amoxicillin (AMOXIL) 875 mg tablet Take 1 Tab by mouth two (2) times a day for 10 days. 1/5/21 1/15/21  Devin Arce NP   zolpidem (AMBIEN) 5 mg tablet take 1 tablet by mouth every evening for sleep. 10/6/20   Pradip Davila MD   sildenafil citrate (Viagra) 100 mg tablet Take 1 Tab by mouth daily as needed (ED). 10/6/20   Pradip Davila MD   MULTIVITAMINS (MULTI-VITAMIN PO) Take  by mouth. Provider, Historical   aspirin delayed-release (ASPIR-81) 81 mg tablet Take 81 mg by mouth daily. Provider, Historical     Patient Active Problem List   Diagnosis Code    Atrial fibrillation [facility]) I48.91    Cardiac valvular malformation Q24.8    Screening for colon cancer Z12.11    Onychomycosis B35.1    Skin cancer screening Z12.83    Seasonal allergic rhinitis J30.2    Other general counseling and advice for contraceptive management Z30.09    Sterilization Z30.2    Cough R05    Wheezing R06.2    Acute URI J06.9    URI, acute J06.9    Acute non-recurrent sinusitis J01.90    Glaucoma suspect of both eyes H40.003    Insomnia G47.00     Current Outpatient Medications   Medication Sig Dispense Refill    montelukast (SINGULAIR) 10 mg tablet Take 1 Tab by mouth daily. 90 Tab 0    cetirizine (ZYRTEC) 10 mg tablet Take 1 Tab by mouth two (2) times a day. 180 Tab 0    albuterol (PROVENTIL HFA, VENTOLIN HFA, PROAIR HFA) 90 mcg/actuation inhaler Take 2 Puffs by inhalation every four (4) hours as needed for Wheezing or Shortness of Breath. 1 Inhaler 0    guaiFENesin (Tussin Chest Congestion) 100 mg/5 mL liquid Take 10 mL by mouth three (3) times daily as needed for Cough. 1 Bottle 0    amoxicillin (AMOXIL) 875 mg tablet Take 1 Tab by mouth two (2) times a day for 10 days. 20 Tab 0    zolpidem (AMBIEN) 5 mg tablet take 1 tablet by mouth every evening for sleep. 30 Tab 3    sildenafil citrate (Viagra) 100 mg tablet Take 1 Tab by mouth daily as needed (ED). 20 Tab 2    MULTIVITAMINS (MULTI-VITAMIN PO) Take  by mouth.       aspirin delayed-release (ASPIR-81) 81 mg tablet Take 81 mg by mouth daily. Allergies   Allergen Reactions    Naprosyn [Naproxen] Other (comments)     Felt like very drowsy and near syncope. Review of Systems   Respiratory: Positive for cough, sputum production, shortness of breath and wheezing. Objective:   No flowsheet data found. General: alert, cooperative, no distress   Mental  status: normal mood, behavior, speech, dress, motor activity, and thought processes, able to follow commands   HENT: NCAT   Neck: no visualized mass   Resp: no respiratory distress   Neuro: no gross deficits   Skin: no discoloration or lesions of concern on visible areas   Psychiatric: normal affect, consistent with stated mood, no evidence of hallucinations     Additional exam findings: N/A      We discussed the expected course, resolution and complications of the diagnosis(es) in detail. Medication risks, benefits, costs, interactions, and alternatives were discussed as indicated. I advised him to contact the office if his condition worsens, changes or fails to improve as anticipated. He expressed understanding with the diagnosis(es) and plan. Ar Ansari, who was evaluated through a patient-initiated, synchronous (real-time) audio-video encounter, and/or his healthcare decision maker, is aware that it is a billable service, with coverage as determined by his insurance carrier. He provided verbal consent to proceed: Yes, and patient identification was verified. It was conducted pursuant to the emergency declaration under the [address] [place] [address], [address] [place] [address] authority and the [employer] General Act. A caregiver was present when appropriate. Ability to conduct physical exam was limited. I was at home. The patient was at home.       Rodolfo Cooper NP

## 2021-02-15 ENCOUNTER — VIRTUAL VISIT (OUTPATIENT)
Dept: FAMILY MEDICINE CLINIC | Age: 61
End: 2021-02-15
Payer: OTHER GOVERNMENT

## 2021-02-15 DIAGNOSIS — J30.81 ALLERGIC RHINITIS DUE TO ANIMAL (CAT) (DOG) HAIR AND DANDER: Primary | ICD-10-CM

## 2021-02-15 PROCEDURE — 99212 OFFICE O/P EST SF 10 MIN: CPT | Performed by: NURSE PRACTITIONER

## 2021-02-15 NOTE — PROGRESS NOTES
Ted Blevins is a 63 y.o. male who was seen by synchronous (real-time) audio-video technology on 2/15/2021 for Allergic Rhinitis    Pt is following up on his allergic rhinitis and probable cat allergy. Pt says that the medications provided has worked very well He has had no further issues with wheezing, cough, or chest tightness. Pt is still awaiting a call from his allergy referral. He has also never received a call about his colonoscopy from Dr. Amadeo Yan office. Pt states he has call multiple time and left multiple messages with not return call. Assessment & Plan:   Diagnoses and all orders for this visit:    1. Allergic rhinitis due to animal (cat) (dog) hair and dander      Follow-up and Dispositions    · Return if symptoms worsen or fail to improve. 712  Subjective:       Prior to Admission medications    Medication Sig Start Date End Date Taking? Authorizing Provider   montelukast (SINGULAIR) 10 mg tablet Take 1 Tab by mouth daily. 1/13/21  Yes Fabio Ruffin NP   cetirizine (ZYRTEC) 10 mg tablet Take 1 Tab by mouth two (2) times a day. 1/13/21  Yes Fabio Ruffin NP   albuterol (PROVENTIL HFA, VENTOLIN HFA, PROAIR HFA) 90 mcg/actuation inhaler Take 2 Puffs by inhalation every four (4) hours as needed for Wheezing or Shortness of Breath. 1/5/21  Yes Fabio Ruffin NP   zolpidem (AMBIEN) 5 mg tablet take 1 tablet by mouth every evening for sleep. 10/6/20  Yes Doug Hunter MD   sildenafil citrate (Viagra) 100 mg tablet Take 1 Tab by mouth daily as needed (ED). 10/6/20  Yes Doug Hunter MD   MULTIVITAMINS (MULTI-VITAMIN PO) Take  by mouth. Yes Provider, Historical   aspirin delayed-release (ASPIR-81) 81 mg tablet Take 81 mg by mouth daily. Yes Provider, Historical   guaiFENesin (Tussin Chest Congestion) 100 mg/5 mL liquid Take 10 mL by mouth three (3) times daily as needed for Cough.  1/5/21 2/15/21  Fabio Ruffin NP     Patient Active Problem List   Diagnosis Code  Atrial fibrillation ([facility]) I48.91    Cardiac valvular malformation Q24.8    Screening for colon cancer Z12.11    Onychomycosis B35.1    Skin cancer screening Z12.83    Seasonal allergic rhinitis J30.2    Other general counseling and advice for contraceptive management Z30.09    Sterilization Z30.2    Cough R05    Wheezing R06.2    Acute URI J06.9    URI, acute J06.9    Acute non-recurrent sinusitis J01.90    Glaucoma suspect of both eyes H40.003    Insomnia G47.00     Current Outpatient Medications   Medication Sig Dispense Refill    montelukast (SINGULAIR) 10 mg tablet Take 1 Tab by mouth daily. 90 Tab 0    cetirizine (ZYRTEC) 10 mg tablet Take 1 Tab by mouth two (2) times a day. 180 Tab 0    albuterol (PROVENTIL HFA, VENTOLIN HFA, PROAIR HFA) 90 mcg/actuation inhaler Take 2 Puffs by inhalation every four (4) hours as needed for Wheezing or Shortness of Breath. 1 Inhaler 0    zolpidem (AMBIEN) 5 mg tablet take 1 tablet by mouth every evening for sleep. 30 Tab 3    sildenafil citrate (Viagra) 100 mg tablet Take 1 Tab by mouth daily as needed (ED). 20 Tab 2    MULTIVITAMINS (MULTI-VITAMIN PO) Take  by mouth.  aspirin delayed-release (ASPIR-81) 81 mg tablet Take 81 mg by mouth daily. Allergies   Allergen Reactions    Naprosyn [Naproxen] Other (comments)     Felt like very drowsy and near syncope. ROS    Objective:   No flowsheet data found. General: alert, cooperative, no distress   Mental  status: normal mood, behavior, speech, dress, motor activity, and thought processes, able to follow commands   HENT: NCAT   Neck: no visualized mass   Resp: no respiratory distress   Neuro: no gross deficits   Skin: no discoloration or lesions of concern on visible areas   Psychiatric: normal affect, consistent with stated mood, no evidence of hallucinations     Additional exam findings: N/A      We discussed the expected course, resolution and complications of the diagnosis(es) in detail. Medication risks, benefits, costs, interactions, and alternatives were discussed as indicated. I advised him to contact the office if his condition worsens, changes or fails to improve as anticipated. He expressed understanding with the diagnosis(es) and plan. Isabel Bermudez, who was evaluated through a patient-initiated, synchronous (real-time) audio-video encounter, and/or his healthcare decision maker, is aware that it is a billable service, with coverage as determined by his insurance carrier. He provided verbal consent to proceed: Yes, and patient identification was verified. It was conducted pursuant to the emergency declaration under the [address] [place] [address], [address] [place] [address] authority and the [employer] General Act. A caregiver was present when appropriate. Ability to conduct physical exam was limited. I was in the office. The patient was at home.       Umang Charlton NP

## 2021-02-17 ENCOUNTER — DOCUMENTATION ONLY (OUTPATIENT)
Dept: FAMILY MEDICINE CLINIC | Age: 61
End: 2021-02-17

## 2021-04-08 DIAGNOSIS — G47.00 INSOMNIA, UNSPECIFIED TYPE: ICD-10-CM

## 2021-04-11 DIAGNOSIS — R07.89 CHEST TIGHTNESS: ICD-10-CM

## 2021-04-11 DIAGNOSIS — R05.9 COUGH: ICD-10-CM

## 2021-04-11 DIAGNOSIS — R06.2 WHEEZING: ICD-10-CM

## 2021-04-11 RX ORDER — ZOLPIDEM TARTRATE 5 MG/1
TABLET ORAL
Qty: 30 TAB | Refills: 2 | Status: SHIPPED | OUTPATIENT
Start: 2021-04-11 | End: 2022-08-10 | Stop reason: SDUPTHER

## 2021-04-12 NOTE — TELEPHONE ENCOUNTER
Last Visit: 10/6/20 with MD Juan Covington  Next Appointment: Pita Resendiz to follow-up in 1 year  Previous Refill Encounter(s): 1/13/21 90 d/s    Requested Prescriptions     Pending Prescriptions Disp Refills    montelukast (SINGULAIR) 10 mg tablet 90 Tab 2     Sig: Take 1 Tab by mouth daily.  cetirizine (ZYRTEC) 10 mg tablet 180 Tab 2     Sig: Take 1 Tab by mouth two (2) times a day.

## 2021-04-16 RX ORDER — MONTELUKAST SODIUM 10 MG/1
10 TABLET ORAL DAILY
Qty: 90 TAB | Refills: 2 | Status: SHIPPED | OUTPATIENT
Start: 2021-04-16 | End: 2022-01-08

## 2021-04-16 RX ORDER — CETIRIZINE HCL 10 MG
10 TABLET ORAL 2 TIMES DAILY
Qty: 180 TAB | Refills: 2 | Status: SHIPPED | OUTPATIENT
Start: 2021-04-16

## 2021-07-12 ENCOUNTER — TELEPHONE (OUTPATIENT)
Dept: FAMILY MEDICINE CLINIC | Age: 61
End: 2021-07-12

## 2021-07-12 NOTE — TELEPHONE ENCOUNTER
Spoke with patient - referral has been updated and faxed to [facility]. Confirmation/documents scanned into media .

## 2021-07-12 NOTE — TELEPHONE ENCOUNTER
PT states [facility] says he needs a new [facility] referral. He is scheduled for surgery on 07/20/21  Please advise

## 2022-01-07 DIAGNOSIS — R06.2 WHEEZING: ICD-10-CM

## 2022-01-07 DIAGNOSIS — R07.89 CHEST TIGHTNESS: ICD-10-CM

## 2022-01-08 RX ORDER — MONTELUKAST SODIUM 10 MG/1
TABLET ORAL
Qty: 90 TABLET | Refills: 2 | Status: SHIPPED | OUTPATIENT
Start: 2022-01-08 | End: 2022-09-30

## 2022-02-09 ENCOUNTER — HOSPITAL ENCOUNTER (OUTPATIENT)
Dept: LAB | Age: 62
Discharge: HOME | End: 2022-02-09
Payer: OTHER GOVERNMENT

## 2022-02-09 ENCOUNTER — OFFICE VISIT (OUTPATIENT)
Dept: FAMILY MEDICINE CLINIC | Age: 62
End: 2022-02-09
Payer: OTHER GOVERNMENT

## 2022-02-09 VITALS
OXYGEN SATURATION: 97 % | SYSTOLIC BLOOD PRESSURE: 128 MMHG | HEIGHT: 70 IN | BODY MASS INDEX: 27.54 KG/M2 | HEART RATE: 68 BPM | TEMPERATURE: 97.5 F | WEIGHT: 192.4 LBS | RESPIRATION RATE: 16 BRPM | DIASTOLIC BLOOD PRESSURE: 86 MMHG

## 2022-02-09 DIAGNOSIS — Z13.6 SCREENING FOR CARDIOVASCULAR CONDITION: ICD-10-CM

## 2022-02-09 DIAGNOSIS — L98.9 SKIN LESION: ICD-10-CM

## 2022-02-09 DIAGNOSIS — Z12.5 SCREENING FOR PROSTATE CANCER: ICD-10-CM

## 2022-02-09 DIAGNOSIS — Z00.00 WELL ADULT EXAM: Primary | ICD-10-CM

## 2022-02-09 DIAGNOSIS — H40.89 OTHER SPECIFIED GLAUCOMA, UNSPECIFIED LATERALITY: ICD-10-CM

## 2022-02-09 DIAGNOSIS — Z23 NEEDS FLU SHOT: ICD-10-CM

## 2022-02-09 DIAGNOSIS — G47.33 OSA (OBSTRUCTIVE SLEEP APNEA): ICD-10-CM

## 2022-02-09 LAB
ALBUMIN SERPL-MCNC: 4.3 G/DL (ref 3.4–5)
ALBUMIN/GLOB SERPL: 1.5 {RATIO} (ref 0.8–1.7)
ALP SERPL-CCNC: 61 U/L (ref 45–117)
ALT SERPL-CCNC: 63 U/L (ref 16–61)
ANION GAP SERPL CALC-SCNC: 6 MMOL/L (ref 3–18)
AST SERPL-CCNC: 25 U/L (ref 10–38)
BILIRUB SERPL-MCNC: 0.9 MG/DL (ref 0.2–1)
BUN SERPL-MCNC: 11 MG/DL (ref 7–18)
BUN/CREAT SERPL: 13 (ref 12–20)
CALCIUM SERPL-MCNC: 9.2 MG/DL (ref 8.5–10.1)
CHLORIDE SERPL-SCNC: 104 MMOL/L (ref 100–111)
CHOLEST SERPL-MCNC: 187 MG/DL
CO2 SERPL-SCNC: 28 MMOL/L (ref 21–32)
CREAT SERPL-MCNC: 0.87 MG/DL (ref 0.6–1.3)
GLOBULIN SER CALC-MCNC: 2.9 G/DL (ref 2–4)
GLUCOSE SERPL-MCNC: 90 MG/DL (ref 74–99)
HDLC SERPL-MCNC: 58 MG/DL (ref 40–60)
HDLC SERPL: 3.2 {RATIO} (ref 0–5)
LDLC SERPL CALC-MCNC: 118.6 MG/DL (ref 0–100)
LIPID PROFILE,FLP: ABNORMAL
POTASSIUM SERPL-SCNC: 4.3 MMOL/L (ref 3.5–5.5)
PROT SERPL-MCNC: 7.2 G/DL (ref 6.4–8.2)
SODIUM SERPL-SCNC: 138 MMOL/L (ref 136–145)
TRIGL SERPL-MCNC: 52 MG/DL (ref ?–150)
VLDLC SERPL CALC-MCNC: 10.4 MG/DL

## 2022-02-09 PROCEDURE — 84153 ASSAY OF PSA TOTAL: CPT

## 2022-02-09 PROCEDURE — 80053 COMPREHEN METABOLIC PANEL: CPT

## 2022-02-09 PROCEDURE — 90686 IIV4 VACC NO PRSV 0.5 ML IM: CPT | Performed by: FAMILY MEDICINE

## 2022-02-09 PROCEDURE — 80061 LIPID PANEL: CPT

## 2022-02-09 PROCEDURE — 99396 PREV VISIT EST AGE 40-64: CPT | Performed by: FAMILY MEDICINE

## 2022-02-09 NOTE — PROGRESS NOTES
1. \"Have you been to the ER, urgent care clinic since your last visit? Hospitalized since your last visit? \" No    2. \"Have you seen or consulted any other health care providers outside of the [address] [place] [address] since your last visit? \" Yes neurology and dermatology      3. For patients aged 39-70: Has the patient had a colonoscopy / FIT/ Cologuard? Yes - no Care Gap present      Patient received influenza vaccine 0.5ml in left deltoid. Tolerated well. No signs or symptoms of distress noted. Most current VIS given and consent signed.

## 2022-02-09 NOTE — PATIENT INSTRUCTIONS
Well Visit, Men 48 to 72: Care Instructions  Overview     Well visits can help you stay healthy. Your doctor has checked your overall health and may have suggested ways to take good care of yourself. Your doctor also may have recommended tests. At home, you can help prevent illness with healthy eating, regular exercise, and other steps. Follow-up care is a key part of your treatment and safety. Be sure to make and go to all appointments, and call your doctor if you are having problems. It's also a good idea to know your test results and keep a list of the medicines you take. How can you care for yourself at home? · Get screening tests that you and your doctor decide on. Screening helps find diseases before any symptoms appear. · Eat healthy foods. Choose fruits, vegetables, whole grains, protein, and low-fat dairy foods. Limit fat, especially saturated fat. Reduce salt in your diet. · Limit alcohol. Have no more than 2 drinks a day or 14 drinks a week. · Get at least 30 minutes of exercise on most days of the week. Walking is a good choice. You also may want to do other activities, such as running, swimming, cycling, or playing tennis or team sports. · Reach and stay at a healthy weight. This will lower your risk for many problems, such as obesity, diabetes, heart disease, and high blood pressure. · Do not smoke. Smoking can make health problems worse. If you need help quitting, talk to your doctor about stop-smoking programs and medicines. These can increase your chances of quitting for good. · Care for your mental health. It is easy to get weighed down by worry and stress. Learn strategies to manage stress, like deep breathing and mindfulness, and stay connected with your family and community. If you find you often feel sad or hopeless, talk with your doctor. Treatment can help. · Talk to your doctor about whether you have any risk factors for sexually transmitted infections (STIs).  You can help prevent STIs if you wait to have sex with a new partner (or partners) until you've each been tested for STIs. It also helps if you use condoms (male or female condoms) and if you limit your sex partners to one person who only has sex with you. Vaccines are available for some STIs. · If it's important to you to prevent pregnancy with your partner, talk with your doctor about birth control options that might be best for you. · If you think you may have a problem with alcohol or drug use, talk to your doctor. This includes prescription medicines (such as amphetamines and opioids) and illegal drugs (such as cocaine and methamphetamine). Your doctor can help you figure out what type of treatment is best for you. · Protect your skin from too much sun. When you're outdoors from 10 a.m. to 4 p.m., stay in the shade or cover up with clothing and a hat with a wide brim. Wear sunglasses that block UV rays. Even when it's cloudy, put broad-spectrum sunscreen (SPF 30 or higher) on any exposed skin. · See a dentist one or two times a year for checkups and to have your teeth cleaned. · Wear a seat belt in the car. When should you call for help? Watch closely for changes in your health, and be sure to contact your doctor if you have any problems or symptoms that concern you. Where can you learn more? Go to http://www.gray.com/  Enter D928 in the search box to learn more about \"Well Visit, Men 48 to 72: Care Instructions. \"  Current as of: February 11, 2021               Content Version: 13.0  © 1462-2704 Healthwise, Incorporated. Care instructions adapted under license by [employer] (which disclaims liability or warranty for this information). If you have questions about a medical condition or this instruction, always ask your healthcare professional. [address] any warranty or liability for your use of this information.

## 2022-02-11 LAB
PSA SERPL-MCNC: 0.6 NG/ML (ref 0–4)
REFLEX CRITERIA: NORMAL

## 2022-02-13 NOTE — PROGRESS NOTES
Subjective:     Eren Yoo is a 57 y.o. male presenting for annual exam and complete physical.    Patient has been well. He is in need of a referral to his ophthalmologist for glaucoma follow-up. Patient also is due for blood test.  Patient has A. fib that is followed by cardiology. Patient has a skin lesion that he would like evaluated by dermatology. He has one on his right upper arm that he has been aware of. He is not on his chest wall that he is not able to see well but his wife has noted    Patient Active Problem List    Diagnosis Date Noted    Glaucoma suspect of both eyes 10/11/2020    Insomnia 10/11/2020    Cough 11/11/2019    Wheezing 11/11/2019    Acute URI 11/11/2019    URI, acute 11/11/2019    Acute non-recurrent sinusitis 11/11/2019    Sterilization 11/14/2014    Other general counseling and advice for contraceptive management 09/08/2014    Seasonal allergic rhinitis 04/19/2012    Skin cancer screening 10/06/2010    Onychomycosis 05/28/2010    Screening for colon cancer 03/23/2010    Atrial fibrillation ([facility] [address] 01/05/2010    Cardiac valvular malformation 01/05/2010     Current Outpatient Medications   Medication Sig Dispense Refill    cpap machine kit by Does Not Apply route.  montelukast (SINGULAIR) 10 mg tablet TAKE 1 TABLET BY MOUTH DAILY 90 Tablet 2    cetirizine (ZYRTEC) 10 mg tablet Take 1 Tab by mouth two (2) times a day. (Patient taking differently: Take 10 mg by mouth two (2) times a day. PRN) 180 Tab 2    zolpidem (AMBIEN) 5 mg tablet TAKE 1 TABLET BY MOUTH EVERY EVENING FOR SLEEP 30 Tab 2    sildenafil citrate (Viagra) 100 mg tablet Take 1 Tab by mouth daily as needed (ED). 20 Tab 2    MULTIVITAMINS (MULTI-VITAMIN PO) Take  by mouth.  albuterol (PROVENTIL HFA, VENTOLIN HFA, PROAIR HFA) 90 mcg/actuation inhaler Take 2 Puffs by inhalation every four (4) hours as needed for Wheezing or Shortness of Breath.  (Patient not taking: Reported on 2/9/2022) 1 Inhaler 0    aspirin delayed-release (ASPIR-81) 81 mg tablet Take 81 mg by mouth daily. (Patient not taking: Reported on 2/9/2022)       Allergies   Allergen Reactions    Naprosyn [Naproxen] Other (comments)     Felt like very drowsy and near syncope. Past Medical History:   Diagnosis Date    Atrial fibrillation ([address] 1/5/2010    Cardiac valvular malformation 1/5/2010    H/O colonoscopy 2010    f/u 10 years Dr. Jarvis Nicole     Past Surgical History:   Procedure Laterality Date    HX KNEE ARTHROSCOPY  2008    left knee meniscus tear    HX VASECTOMY  11/1/2014    [place] CARDIAC SURG PROCEDURE UNLIST  1988    open heart surgery to remove a web on the left atrium    [place] REVISE ULNAR NERVE AT ELBOW  2007     Family History   Problem Relation Age of Onset    Heart Disease Mother     Diabetes Maternal Grandmother      Social History     Tobacco Use    Smoking status: Former Smoker    Smokeless tobacco: Never Used   Substance Use Topics    Alcohol use: Yes     Alcohol/week: 5.0 standard drinks     Types: 6 Glasses of wine, 2 - 3 Standard drinks or equivalent per week        Lab Results   Component Value Date/Time    Glucose 90 02/09/2022 01:44 PM    LDL, calculated 118.6 (H) 02/09/2022 01:44 PM    Creatinine 0.87 02/09/2022 01:44 PM      Lab Results   Component Value Date/Time    Cholesterol, total 187 02/09/2022 01:44 PM    HDL Cholesterol 58 02/09/2022 01:44 PM    LDL, calculated 118.6 (H) 02/09/2022 01:44 PM    Triglyceride 52 02/09/2022 01:44 PM    CHOL/HDL Ratio 3.2 02/09/2022 01:44 PM        Review of Systems  A comprehensive review of systems was negative except for that written in the HPI.     Objective:     Visit Vitals  /86 (BP 1 Location: Right arm, BP Patient Position: Sitting, BP Cuff Size: Large adult)   Pulse 68   Temp 97.5 °F (36.4 °C) (Temporal)   Resp 16   Ht 5' 10\" (1.778 m)   Wt 192 lb 6.4 oz (87.3 kg)   SpO2 97%   BMI 27.61 kg/m²     Physical exam:   General appearance - alert, well appearing, and in no distress  Ears - bilateral TM's and external ear canals normal  Lymphatics - no palpable lymphadenopathy, no hepatosplenomegaly  Chest - clear to auscultation, no wheezes, rales or rhonchi, symmetric air entry  Heart - normal rate, regular rhythm, normal S1, S2, no murmurs, rubs, clicks or gallops  Abdomen - soft, nontender, nondistended, no masses or organomegaly  Neurological - alert, oriented, normal speech, no focal findings or movement disorder noted  Musculoskeletal - no joint tenderness, deformity or swelling  Extremities - no pedal edema noted  Skin - right anterior arm with a hyperpigmented 1 cm lesion with some irregularity. There is a smaller lesion noted on the rightward chest wall; pinpoint, uniform color, there is evidence of a seborrheic keratosis on the left lower abdomen. Assessment/Plan:         ICD-10-CM ICD-9-CM    1. Well adult exam  Z00.00 V70.0    2. Skin lesion  L98.9 709.9 REFERRAL TO DERMATOLOGY   3. Needs flu shot  Z23 V04.81 INFLUENZA VIRUS VAC QUAD,SPLIT,PRESV FREE SYRINGE IM   4. NABOR (obstructive sleep apnea)  G47.33 327.23 REFERRAL TO NEUROLOGY   5. Screening for cardiovascular condition  Z13.6 V81.2 LIPID PANEL      METABOLIC PANEL, COMPREHENSIVE      LIPID PANEL      METABOLIC PANEL, COMPREHENSIVE   6. Other specified glaucoma, unspecified laterality  H40.89 365.89 REFERRAL TO OPHTHALMOLOGY   7. Screening for prostate cancer  Z12.5 V76.44 PSA W/ REFLX FREE PSA   . As above  Pt clinically stable   treatment plan as listed below  Orders Placed This Encounter    INFLUENZA VIRUS VACCINE QUADRIVALENT, PRESERVATIVE FREE SYRINGE (59412)    LIPID PANEL    METABOLIC PANEL, COMPREHENSIVE    LIPID PANEL    METABOLIC PANEL, COMPREHENSIVE    PSA W/ REFLX FREE PSA    REFERRAL TO DERMATOLOGY    REFERRAL TO NEUROLOGY    REFERRAL TO OPHTHALMOLOGY    cpap machine kit     Follow-up and Dispositions    · Return in about 1 year (around 2/9/2023).        This has been fully explained to the patient, who indicates understanding. An After Visit Summary was printed and given to the patient.

## 2022-03-18 PROBLEM — J06.9 ACUTE URI: Status: ACTIVE | Noted: 2019-11-11

## 2022-03-18 PROBLEM — G47.00 INSOMNIA: Status: ACTIVE | Noted: 2020-10-11

## 2022-03-18 PROBLEM — J01.90 ACUTE NON-RECURRENT SINUSITIS: Status: ACTIVE | Noted: 2019-11-11

## 2022-03-19 PROBLEM — R05.9 COUGH: Status: ACTIVE | Noted: 2019-11-11

## 2022-03-19 PROBLEM — J06.9 URI, ACUTE: Status: ACTIVE | Noted: 2019-11-11

## 2022-03-19 PROBLEM — R06.2 WHEEZING: Status: ACTIVE | Noted: 2019-11-11

## 2022-03-19 PROBLEM — H40.003 GLAUCOMA SUSPECT OF BOTH EYES: Status: ACTIVE | Noted: 2020-10-11

## 2022-08-10 DIAGNOSIS — G47.00 INSOMNIA, UNSPECIFIED TYPE: ICD-10-CM

## 2022-08-10 NOTE — TELEPHONE ENCOUNTER
Last Visit: 2/9/22 with MD Emmanuelle Marcum  Next Appointment: Cecile Rubi to follow-up in 1 year  Previous Refill Encounter(s): 4/11/21 #30 with 2 refills    Requested Prescriptions     Pending Prescriptions Disp Refills    zolpidem (AMBIEN) 5 mg tablet 30 Tablet 2     Sig: Take 1 Tablet by mouth nightly. Max Daily Amount: 5 mg. Indications: sleep         For Pharmacy Admin Tracking Only    CPA in place:   Recommendation Provided To:    Intervention Detail: New Rx: 1, reason: Patient Preference  Gap Closed?:   Intervention Accepted By:   Time Spent (min): 5

## 2022-08-13 RX ORDER — ZOLPIDEM TARTRATE 5 MG/1
5 TABLET ORAL
Qty: 30 TABLET | Refills: 2 | Status: SHIPPED | OUTPATIENT
Start: 2022-08-13

## 2022-09-28 DIAGNOSIS — R06.2 WHEEZING: ICD-10-CM

## 2022-09-28 DIAGNOSIS — R07.89 CHEST TIGHTNESS: ICD-10-CM

## 2022-09-30 RX ORDER — MONTELUKAST SODIUM 10 MG/1
TABLET ORAL
Qty: 90 TABLET | Refills: 2 | Status: SHIPPED | OUTPATIENT
Start: 2022-09-30

## 2022-10-27 NOTE — TELEPHONE ENCOUNTER
Last Visit: 2/9/22 with MD Valdez Pickard  Next Appointment: Johann Snellen to follow-up in 1 year  Previous Refill Encounter(s): 10/6/20 #20 with 2 refills    Requested Prescriptions     Pending Prescriptions Disp Refills    sildenafil citrate (Viagra) 100 mg tablet 20 Tablet 2     Sig: Take 1 Tablet by mouth daily as needed (ED). For [facility] in place:   Recommendation Provided To:    Intervention Detail: New Rx: 1, reason: Patient Preference  Gap Closed?:   Intervention Accepted By:   Time Spent (min): 5

## 2022-11-04 RX ORDER — SILDENAFIL 100 MG/1
100 TABLET, FILM COATED ORAL
Qty: 20 TABLET | Refills: 2 | Status: SHIPPED | OUTPATIENT
Start: 2022-11-04

## 2022-11-21 ENCOUNTER — VIRTUAL VISIT (OUTPATIENT)
Dept: FAMILY MEDICINE CLINIC | Age: 62
End: 2022-11-21
Payer: OTHER GOVERNMENT

## 2022-11-21 DIAGNOSIS — M71.22 SYNOVIAL CYST OF LEFT POPLITEAL SPACE: Primary | ICD-10-CM

## 2022-11-21 PROCEDURE — 99214 OFFICE O/P EST MOD 30 MIN: CPT | Performed by: FAMILY MEDICINE

## 2022-11-21 RX ORDER — TRIAMCINOLONE ACETONIDE 1 MG/G
CREAM TOPICAL
COMMUNITY
Start: 2022-11-03

## 2022-11-21 NOTE — PROGRESS NOTES
Alonso Carlson (: 1960) is a 57 y.o. male, established patient, here for evaluation of the following chief complaint(s):   Cyst (Back of left leg behind knee since spring of 2022 )       Alonso Carlson, was evaluated through a synchronous (real-time) audio-video encounter. The patient (or guardian if applicable) is aware that this is a billable service, which includes applicable co-pays. This Virtual Visit was conducted with patient's (and/or legal guardian's) consent. The visit was conducted pursuant to the emergency declaration under the [address] [place] [address], [address] [place] [address] authority and the [employer] General Act. Patient identification was verified, and a caregiver was present when appropriate. The patient was located at: Home: [address] [facility] [address]  The provider was located at: Facility (Appt Department): [address]  [address] [facility]       An [address] [facility] was used to authenticate this note.   -- Alison Doing

## 2022-11-21 NOTE — Clinical Note
I have asked patient to come to the office on 11/23/2022 ( Wednesday) at 7:45 AM for a\" look/see\" assessment of his left leg cyst.  Thank you

## 2022-11-22 NOTE — PROGRESS NOTES
Jude Manzano is a 57 y.o. male who was seen by synchronous (real-time) audio-video technology on 11/21/2022 for Cyst (Back of left leg behind knee since spring of 2022 )        Assessment & Plan:   Diagnoses and all orders for this visit:    1. Synovial cyst of left popliteal space      At this current time it does appear that the area by description, position and suggested referral resources is a Baker's cyst.  I have however recommended that patient stop by the office so that this private provider may directly visualize the lesion. He is able to come to the office on November 23 at 7:45 AM for this evaluation. It is at that point specific disposition and referral will be placed. Patient voiced understanding. Subjective:       Patient has been seen by dermatology for his eczema. The eczema has improved. He has however noted a\" bump\" behind his left leg. This was also observed by his dermatologist and the dermatologist recommended that patient be seen by sports medicine. He states that the bump has been present since the springtime and has been getting harder. There is no significant pain. It is movable but more so with manipulation. He was given a specific resource for referral though he is unsure of the specific doctor's name. The referral source is worse a sports medicine practice in [place]. Prior to Admission medications    Medication Sig Start Date End Date Taking? Authorizing Provider   triamcinolone acetonide (KENALOG) 0.1 % topical cream  11/3/22  Yes Provider, Historical   sildenafil citrate (Viagra) 100 mg tablet Take 1 Tablet by mouth daily as needed (ED). 11/4/22  Yes Edward Resendez MD   montelukast (SINGULAIR) 10 mg tablet TAKE 1 TABLET BY MOUTH DAILY 9/30/22  Yes Edward Resendez MD   zolpidem (AMBIEN) 5 mg tablet Take 1 Tablet by mouth nightly. Max Daily Amount: 5 mg. Indications: sleep 8/13/22  Yes Edward Resendez MD   cpap machine kit by Does Not Apply route.    Yes Provider, Historical   cetirizine (ZYRTEC) 10 mg tablet Take 1 Tab by mouth two (2) times a day. Patient taking differently: Take 10 mg by mouth two (2) times daily as needed. 4/16/21  Yes Mitul Angulo MD   MULTIVITAMINS (MULTI-VITAMIN PO) Take  by mouth. Yes Provider, Historical   aspirin delayed-release 81 mg tablet Take 81 mg by mouth daily. Yes Provider, Historical   albuterol (PROVENTIL HFA, VENTOLIN HFA, PROAIR HFA) 90 mcg/actuation inhaler Take 2 Puffs by inhalation every four (4) hours as needed for Wheezing or Shortness of Breath. Patient not taking: No sig reported 1/5/21   Kelvin Raphael NP     Patient Active Problem List    Diagnosis Date Noted    Glaucoma suspect of both eyes 10/11/2020    Insomnia 10/11/2020    Cough 11/11/2019    Wheezing 11/11/2019    Acute URI 11/11/2019    URI, acute 11/11/2019    Acute non-recurrent sinusitis 11/11/2019    Sterilization 11/14/2014    Other general counseling and advice for contraceptive management 09/08/2014    Seasonal allergic rhinitis 04/19/2012    Skin cancer screening 10/06/2010    Onychomycosis 05/28/2010    Screening for colon cancer 03/23/2010    Atrial fibrillation ([facility] [address] 01/05/2010    Cardiac valvular malformation 01/05/2010     Current Outpatient Medications   Medication Sig Dispense Refill    triamcinolone acetonide (KENALOG) 0.1 % topical cream       sildenafil citrate (Viagra) 100 mg tablet Take 1 Tablet by mouth daily as needed (ED). 20 Tablet 2    montelukast (SINGULAIR) 10 mg tablet TAKE 1 TABLET BY MOUTH DAILY 90 Tablet 2    zolpidem (AMBIEN) 5 mg tablet Take 1 Tablet by mouth nightly. Max Daily Amount: 5 mg. Indications: sleep 30 Tablet 2    cpap machine kit by Does Not Apply route. cetirizine (ZYRTEC) 10 mg tablet Take 1 Tab by mouth two (2) times a day. (Patient taking differently: Take 10 mg by mouth two (2) times daily as needed.) 180 Tab 2    MULTIVITAMINS (MULTI-VITAMIN PO) Take  by mouth.       aspirin delayed-release 81 mg tablet Take 81 mg by mouth daily. albuterol (PROVENTIL HFA, VENTOLIN HFA, PROAIR HFA) 90 mcg/actuation inhaler Take 2 Puffs by inhalation every four (4) hours as needed for Wheezing or Shortness of Breath. (Patient not taking: No sig reported) 1 Inhaler 0     Allergies   Allergen Reactions    Naprosyn [Naproxen] Other (comments)     Felt like very drowsy and near syncope. Past Medical History:   Diagnosis Date    Atrial fibrillation ([address] 1/5/2010    Cardiac valvular malformation 1/5/2010    H/O colonoscopy 2010    f/u 10 years Dr. Bryce Allen     Past Surgical History:   Procedure Laterality Date    HX KNEE ARTHROSCOPY  2008    left knee meniscus tear    HX VASECTOMY  11/1/2014    [place] CARDIAC SURG PROCEDURE UNLIST  1988    open heart surgery to remove a web on the left atrium    [place] REVISE ULNAR NERVE AT ELBOW  2007     Family History   Problem Relation Age of Onset    Heart Disease Mother     Diabetes Maternal Grandmother      Social History     Tobacco Use    Smoking status: Former    Smokeless tobacco: Never   Substance Use Topics    Alcohol use: Yes     Alcohol/week: 5.0 standard drinks     Types: 6 Glasses of wine, 2 - 3 Standard drinks or equivalent per week       ROS as per HPI    Objective:   No flowsheet data found. General: alert, cooperative, no distress   Mental  status: normal mood, behavior, speech, dress, motor activity, and thought processes, able to follow commands   HENT: NCAT   Neck: no visualized mass   Resp: no respiratory distress   Neuro: no gross deficits   Skin: no discoloration or lesions of concern on visible areas   Psychiatric: normal affect, consistent with stated mood, no evidence of hallucinations     Additional exam findings: There is on this video visit a prominence noted at the posterior aspect of the left knee. It does appear to be somewhat distal to the popliteal fossa but rather adjacent as well.   At this point it was recommended that patient come to the office at his convenience for provider assessment of the lesion itself. He agreed with this plan. We discussed the expected course, resolution and complications of the diagnosis(es) in detail. Medication risks, benefits, costs, interactions, and alternatives were discussed as indicated. I advised him to contact the office if his condition worsens, changes or fails to improve as anticipated. He expressed understanding with the diagnosis(es) and plan. Dom Chavarria, was evaluated through a synchronous (real-time) audio-video encounter. The patient (or guardian if applicable) is aware that this is a billable service, which includes applicable co-pays. This Virtual Visit was conducted with patient's (and/or legal guardian's) consent. The visit was conducted pursuant to the emergency declaration under the [address] [place] [address] authority and the [facility] General Act. Patient identification was verified, and a caregiver was present when appropriate. The patient was located at: Home: [address] [address]  The provider was located at:  Facility (Appt Department): [id number] Johan Pedraza MD

## 2022-12-15 ENCOUNTER — PATIENT MESSAGE (OUTPATIENT)
Dept: FAMILY MEDICINE CLINIC | Age: 62
End: 2022-12-15

## 2022-12-15 DIAGNOSIS — M71.22 SYNOVIAL CYST OF LEFT POPLITEAL SPACE: Primary | ICD-10-CM

## 2022-12-19 NOTE — TELEPHONE ENCOUNTER
From: Carlos Lopez  To: Marlen Serrano MD  Sent: 12/15/2022 4:54 PM EST  Subject: Referral for removal of Bakers Cyst behind left knee    Good Evening, I had an office vist appointment with Dr. Leontine Moritz three weeks ago wherein she was going to submit a referral to [facility] to have the cyst removed by a surgeon in [address] [place] [address]. To date I've heard nothing from [address] [place] [address] or your office on the status of this referral. Would you please let me know where we are regarding the referral? The cyst is getting progressively larger.   Thank you,  - Yanet Hayes

## 2023-02-27 ENCOUNTER — PATIENT MESSAGE (OUTPATIENT)
Age: 63
End: 2023-02-27

## 2023-02-27 DIAGNOSIS — M71.22 SYNOVIAL CYST OF POPLITEAL SPACE (BAKER), LEFT KNEE: Primary | ICD-10-CM

## 2023-02-27 DIAGNOSIS — M25.562 CHRONIC PAIN OF LEFT KNEE: ICD-10-CM

## 2023-02-27 DIAGNOSIS — G89.29 CHRONIC PAIN OF LEFT KNEE: ICD-10-CM

## 2023-02-27 NOTE — TELEPHONE ENCOUNTER
From: Dante Mary  To: Dr. Gissel Ray: 2/27/2023 2:20 PM EST  Subject: My follow up visit at [facility]    Good afternoon, Dr. Madie Colmenares, I recently had a follow up at [facility] regarding the Baker cyst behind my left knee after having an MRI.  diagnosed ortheo arthritis in my left knee and sent a referral to [facility] for Gel One injections. [facility] denied the referral saying that the referral for these injections had to come from you, my PCM, and not the specialist. So, would you please send a referral to [facility] for [facility], Dr. Kamryn Worthington to perform these injections in order to alleviate the pain in my left knee? Thank you so much, Dr. Madie Colmenares.

## 2023-03-28 NOTE — TELEPHONE ENCOUNTER
Last Visit: 11- VV   Next Appointment: none  Previous Refill Encounter: 09- #90tabs with 2 refills      Requested Prescriptions     Pending Prescriptions Disp Refills    montelukast (SINGULAIR) 10 MG tablet [Pharmacy Med Name: MONTELUKAST 10MG TABLETS] 90 tablet 2     Sig: TAKE 1 TABLET BY MOUTH DAILY

## 2023-03-31 RX ORDER — MONTELUKAST SODIUM 10 MG/1
TABLET ORAL
Qty: 90 TABLET | Refills: 2 | Status: SHIPPED | OUTPATIENT
Start: 2023-03-31

## 2023-04-05 ENCOUNTER — PATIENT MESSAGE (OUTPATIENT)
Age: 63
End: 2023-04-05

## 2023-04-05 NOTE — TELEPHONE ENCOUNTER
From: Alex Party  To: Dr. Leandra Kennedy: 4/5/2023 10:49 AM EDT  Subject: [facility] Referral     Good morning, Dr. Natalie Johnson. Just a reminder that I still have not received my referral to Dr. Berta Sanchez at [facility] for gel injections in my left knee. Thank you in advance for taking care of this for me.    V/r  Victoriano Caballero \"Cesar\" Iwona

## 2023-04-06 ENCOUNTER — PATIENT MESSAGE (OUTPATIENT)
Age: 63
End: 2023-04-06

## 2023-04-06 NOTE — TELEPHONE ENCOUNTER
From: Laron Ordoñez  To: Dr. Shana Lamar: 4/6/2023 3:49 PM EDT  Subject: [facility] Referral    Good afternoon Dr. Aubrie Castellano and Prince Canseco. I spoke to Alison Smith at [facility] and she said that since Dr. Milton Solitario changed the diagnosis from [address] Cyst to Pain in Left Knee, that you would have to go through  to get the referral. I'm so for the hassle surrounding this issue. Thank you so much for taking care of this for me. If you have any questions please reach out to Alison Smith at [facility]: [phone].   Respectfully,   Julieta Valentin \"Cesar\" Iwona

## 2023-04-28 ENCOUNTER — PATIENT MESSAGE (OUTPATIENT)
Age: 63
End: 2023-04-28

## 2023-04-28 DIAGNOSIS — M25.561 CHRONIC PAIN OF RIGHT KNEE: Primary | ICD-10-CM

## 2023-04-28 DIAGNOSIS — G89.29 CHRONIC PAIN OF RIGHT KNEE: Primary | ICD-10-CM

## 2023-05-02 NOTE — TELEPHONE ENCOUNTER
From: Mounika Davidson  To: Dr. Arroyo Sand: 4/28/2023 11:06 AM EDT  Subject: Referral for right knee gel injections    Good morning, Dr. Bullard Heads   I met with Dr. Rekha Borjas yesterday and he gave me the injection for my left knee but was confused why the referral only covered the left knee and not also the right knee? He said both knees require the gel shots but he cannot give the injection in the right knee without a referral. So, nannette you please request a referral from [address] [place] [address] for right knee gel injection at [facility] location? I have pain in both knees due to years of running. Thank you so much. I think I'm to blame for this confusion. Sorry for the hassle. Hope you have a wonderful weekend.   Walker Phillips \"Cesar\" Alysha Falk

## 2023-05-17 ENCOUNTER — TELEPHONE (OUTPATIENT)
Age: 63
End: 2023-05-17

## 2023-05-17 NOTE — TELEPHONE ENCOUNTER
----- Message from Jono Trammell. Luis Alberto Resendez sent at 5/17/2023 10:09 AM EDT -----  Regarding: Referral for right knee gel injections  Contact: [phone]  Good morning, Dr. Lauryn Calderon called [facility], and they still have not received anything from [facility] for the right knee gel injection. Would you please resubmit the referral so that I can get this taken care of? Thank you.

## 2023-05-26 DIAGNOSIS — F51.01 PRIMARY INSOMNIA: Primary | ICD-10-CM

## 2023-06-01 RX ORDER — ZOLPIDEM TARTRATE 5 MG/1
5 TABLET ORAL NIGHTLY PRN
Qty: 30 TABLET | Refills: 2 | Status: SHIPPED | OUTPATIENT
Start: 2023-06-01 | End: 2023-07-01

## 2023-08-07 ENCOUNTER — PATIENT MESSAGE (OUTPATIENT)
Age: 63
End: 2023-08-07

## 2023-08-07 NOTE — TELEPHONE ENCOUNTER
From: Perlita Lawler  To: Dr. Emily Gould: 8/7/2023 5:21 PM EDT  Subject: Referral for RT knee gel injection     Dr. Quintin Pittman, Dr. Jagdeep Aranda office informed me that they have faxed my records to to twice but I've never heard anything about a referral from you after receiving his records. Please take a moment to locate these documents, review them, and then issue a referral so that I may get the injection. Thank you so much.   Jayshree Espinal \"Cesar\" Renee raman

## 2023-09-05 ENCOUNTER — TELEPHONE (OUTPATIENT)
Age: 63
End: 2023-09-05

## 2023-09-05 NOTE — TELEPHONE ENCOUNTER
Patient called requesting same day appt, states injured back doing yard work over the weekend, none available today, pt declined next available appt, states will go into urgent care

## 2023-09-21 ENCOUNTER — OFFICE VISIT (OUTPATIENT)
Age: 63
End: 2023-09-21
Payer: OTHER GOVERNMENT

## 2023-09-21 VITALS
HEIGHT: 70 IN | RESPIRATION RATE: 16 BRPM | BODY MASS INDEX: 26.31 KG/M2 | SYSTOLIC BLOOD PRESSURE: 139 MMHG | HEART RATE: 80 BPM | TEMPERATURE: 97.3 F | DIASTOLIC BLOOD PRESSURE: 82 MMHG | OXYGEN SATURATION: 98 % | WEIGHT: 183.8 LBS

## 2023-09-21 DIAGNOSIS — I48.11 LONGSTANDING PERSISTENT ATRIAL FIBRILLATION (HCC): ICD-10-CM

## 2023-09-21 DIAGNOSIS — Z00.00 WELL ADULT EXAM: Primary | ICD-10-CM

## 2023-09-21 DIAGNOSIS — R73.9 ELEVATED BLOOD SUGAR: ICD-10-CM

## 2023-09-21 DIAGNOSIS — Z13.6 SCREENING FOR CARDIOVASCULAR CONDITION: ICD-10-CM

## 2023-09-21 PROCEDURE — 90471 IMMUNIZATION ADMIN: CPT | Performed by: FAMILY MEDICINE

## 2023-09-21 PROCEDURE — 99396 PREV VISIT EST AGE 40-64: CPT | Performed by: FAMILY MEDICINE

## 2023-09-21 PROCEDURE — 90674 CCIIV4 VAC NO PRSV 0.5 ML IM: CPT | Performed by: FAMILY MEDICINE

## 2023-09-21 RX ORDER — ZOLPIDEM TARTRATE 5 MG/1
TABLET ORAL
COMMUNITY
Start: 2023-08-24

## 2023-09-21 RX ORDER — FLUOROURACIL 50 MG/G
CREAM TOPICAL
COMMUNITY
Start: 2023-07-12

## 2023-09-21 RX ORDER — SILDENAFIL 100 MG/1
100 TABLET, FILM COATED ORAL DAILY PRN
Qty: 30 TABLET | Refills: 1 | Status: SHIPPED | OUTPATIENT
Start: 2023-09-21

## 2023-09-21 RX ORDER — CALCIPOTRIENE 50 UG/G
CREAM TOPICAL
COMMUNITY
Start: 2023-07-12

## 2023-09-21 RX ORDER — MULTIVITAMIN,THERAPEUTIC
TABLET ORAL
COMMUNITY

## 2023-09-21 SDOH — ECONOMIC STABILITY: INCOME INSECURITY: HOW HARD IS IT FOR YOU TO PAY FOR THE VERY BASICS LIKE FOOD, HOUSING, MEDICAL CARE, AND HEATING?: NOT VERY HARD

## 2023-09-21 SDOH — ECONOMIC STABILITY: FOOD INSECURITY: WITHIN THE PAST 12 MONTHS, YOU WORRIED THAT YOUR FOOD WOULD RUN OUT BEFORE YOU GOT MONEY TO BUY MORE.: NEVER TRUE

## 2023-09-21 SDOH — ECONOMIC STABILITY: HOUSING INSECURITY
IN THE LAST 12 MONTHS, WAS THERE A TIME WHEN YOU DID NOT HAVE A STEADY PLACE TO SLEEP OR SLEPT IN A SHELTER (INCLUDING NOW)?: NO

## 2023-09-21 SDOH — ECONOMIC STABILITY: FOOD INSECURITY: WITHIN THE PAST 12 MONTHS, THE FOOD YOU BOUGHT JUST DIDN'T LAST AND YOU DIDN'T HAVE MONEY TO GET MORE.: NEVER TRUE

## 2023-09-21 ASSESSMENT — PATIENT HEALTH QUESTIONNAIRE - PHQ9
SUM OF ALL RESPONSES TO PHQ QUESTIONS 1-9: 0
SUM OF ALL RESPONSES TO PHQ QUESTIONS 1-9: 0
SUM OF ALL RESPONSES TO PHQ9 QUESTIONS 1 & 2: 0
SUM OF ALL RESPONSES TO PHQ QUESTIONS 1-9: 0
2. FEELING DOWN, DEPRESSED OR HOPELESS: 0
1. LITTLE INTEREST OR PLEASURE IN DOING THINGS: 0
SUM OF ALL RESPONSES TO PHQ QUESTIONS 1-9: 0

## 2023-09-21 ASSESSMENT — ANXIETY QUESTIONNAIRES
2. NOT BEING ABLE TO STOP OR CONTROL WORRYING: 0
6. BECOMING EASILY ANNOYED OR IRRITABLE: 0
3. WORRYING TOO MUCH ABOUT DIFFERENT THINGS: 0
4. TROUBLE RELAXING: 0
5. BEING SO RESTLESS THAT IT IS HARD TO SIT STILL: 0
1. FEELING NERVOUS, ANXIOUS, OR ON EDGE: 0
GAD7 TOTAL SCORE: 0
7. FEELING AFRAID AS IF SOMETHING AWFUL MIGHT HAPPEN: 0
IF YOU CHECKED OFF ANY PROBLEMS ON THIS QUESTIONNAIRE, HOW DIFFICULT HAVE THESE PROBLEMS MADE IT FOR YOU TO DO YOUR WORK, TAKE CARE OF THINGS AT HOME, OR GET ALONG WITH OTHER PEOPLE: NOT DIFFICULT AT ALL

## 2023-10-02 ENCOUNTER — TELEPHONE (OUTPATIENT)
Age: 63
End: 2023-10-02

## 2023-10-02 ENCOUNTER — HOSPITAL ENCOUNTER (OUTPATIENT)
Facility: HOSPITAL | Age: 63
Discharge: HOME | End: 2023-10-05
Payer: OTHER GOVERNMENT

## 2023-10-02 ENCOUNTER — PATIENT MESSAGE (OUTPATIENT)
Age: 63
End: 2023-10-02

## 2023-10-02 ENCOUNTER — APPOINTMENT (OUTPATIENT)
Age: 63
End: 2023-10-02
Payer: OTHER GOVERNMENT

## 2023-10-02 DIAGNOSIS — R73.9 ELEVATED BLOOD SUGAR: ICD-10-CM

## 2023-10-02 DIAGNOSIS — Z13.6 SCREENING FOR CARDIOVASCULAR CONDITION: ICD-10-CM

## 2023-10-02 DIAGNOSIS — I48.11 LONGSTANDING PERSISTENT ATRIAL FIBRILLATION (HCC): ICD-10-CM

## 2023-10-02 LAB
ALBUMIN SERPL-MCNC: 4.1 G/DL (ref 3.4–5)
ALBUMIN/GLOB SERPL: 1.2 (ref 0.8–1.7)
ALP SERPL-CCNC: 66 U/L (ref 45–117)
ALT SERPL-CCNC: 48 U/L (ref 16–61)
ANION GAP SERPL CALC-SCNC: 5 MMOL/L (ref 3–18)
AST SERPL-CCNC: 30 U/L (ref 10–38)
BASOPHILS # BLD: 0.1 K/UL (ref 0–0.1)
BASOPHILS NFR BLD: 2 % (ref 0–2)
BILIRUB SERPL-MCNC: 0.6 MG/DL (ref 0.2–1)
BUN SERPL-MCNC: 13 MG/DL (ref 7–18)
BUN/CREAT SERPL: 14 (ref 12–20)
CALCIUM SERPL-MCNC: 8.9 MG/DL (ref 8.5–10.1)
CHLORIDE SERPL-SCNC: 106 MMOL/L (ref 100–111)
CHOLEST SERPL-MCNC: 189 MG/DL
CO2 SERPL-SCNC: 27 MMOL/L (ref 21–32)
CREAT SERPL-MCNC: 0.9 MG/DL (ref 0.6–1.3)
DIFFERENTIAL METHOD BLD: ABNORMAL
EOSINOPHIL # BLD: 0.2 K/UL (ref 0–0.4)
EOSINOPHIL NFR BLD: 3 % (ref 0–5)
ERYTHROCYTE [DISTWIDTH] IN BLOOD BY AUTOMATED COUNT: 12.6 % (ref 11.6–14.5)
EST. AVERAGE GLUCOSE BLD GHB EST-MCNC: 108 MG/DL
GLOBULIN SER CALC-MCNC: 3.4 G/DL (ref 2–4)
GLUCOSE SERPL-MCNC: 110 MG/DL (ref 74–99)
HBA1C MFR BLD: 5.4 % (ref 4.2–5.6)
HCT VFR BLD AUTO: 46.4 % (ref 36–48)
HDLC SERPL-MCNC: 63 MG/DL (ref 40–60)
HDLC SERPL: 3 (ref 0–5)
HGB BLD-MCNC: 15.4 G/DL (ref 13–16)
IMM GRANULOCYTES # BLD AUTO: 0 K/UL (ref 0–0.04)
IMM GRANULOCYTES NFR BLD AUTO: 0 % (ref 0–0.5)
LDLC SERPL CALC-MCNC: 112.2 MG/DL (ref 0–100)
LIPID PANEL: ABNORMAL
LYMPHOCYTES # BLD: 2.4 K/UL (ref 0.9–3.6)
LYMPHOCYTES NFR BLD: 40 % (ref 21–52)
MCH RBC QN AUTO: 31.6 PG (ref 24–34)
MCHC RBC AUTO-ENTMCNC: 33.2 G/DL (ref 31–37)
MCV RBC AUTO: 95.3 FL (ref 78–100)
MONOCYTES # BLD: 0.7 K/UL (ref 0.05–1.2)
MONOCYTES NFR BLD: 11 % (ref 3–10)
NEUTS SEG # BLD: 2.6 K/UL (ref 1.8–8)
NEUTS SEG NFR BLD: 44 % (ref 40–73)
NRBC # BLD: 0 K/UL (ref 0–0.01)
NRBC BLD-RTO: 0 PER 100 WBC
PLATELET # BLD AUTO: 249 K/UL (ref 135–420)
PMV BLD AUTO: 10.6 FL (ref 9.2–11.8)
POTASSIUM SERPL-SCNC: 4.7 MMOL/L (ref 3.5–5.5)
PROT SERPL-MCNC: 7.5 G/DL (ref 6.4–8.2)
RBC # BLD AUTO: 4.87 M/UL (ref 4.35–5.65)
SODIUM SERPL-SCNC: 138 MMOL/L (ref 136–145)
TRIGL SERPL-MCNC: 69 MG/DL
TSH SERPL DL<=0.05 MIU/L-ACNC: 1.35 UIU/ML (ref 0.36–3.74)
VLDLC SERPL CALC-MCNC: 13.8 MG/DL
WBC # BLD AUTO: 6 K/UL (ref 4.6–13.2)

## 2023-10-02 PROCEDURE — 84443 ASSAY THYROID STIM HORMONE: CPT

## 2023-10-02 PROCEDURE — 85025 COMPLETE CBC W/AUTO DIFF WBC: CPT

## 2023-10-02 PROCEDURE — 36415 COLL VENOUS BLD VENIPUNCTURE: CPT

## 2023-10-02 PROCEDURE — 80053 COMPREHEN METABOLIC PANEL: CPT

## 2023-10-02 PROCEDURE — 80061 LIPID PANEL: CPT

## 2023-10-02 PROCEDURE — 83036 HEMOGLOBIN GLYCOSYLATED A1C: CPT

## 2023-10-02 NOTE — TELEPHONE ENCOUNTER
Spoke with the patient and informed him his Ortho Referral was approved an the patient stated that he saw where [facility] approved the referral as well but [facility] keeps saying it's not. I informed the patient that I will refax the referral over to them again. Patient acknowledged understanding and had no questions or concerns for the provider at this time. Confirmation sheet to be scanned into the patients chart, thank you.

## 2023-10-02 NOTE — TELEPHONE ENCOUNTER
----- Message from Salma Ellis MD sent at 9/26/2023  1:10 PM EDT -----  Patient had a referral placed to an orthopedist for his right knee( not the left knee) and he states he has not heard anything about this referral.  He seems to think it has something to do with how it was processed through [employer].   We check into the status of this knee referral.

## 2023-10-11 ENCOUNTER — OFFICE VISIT (OUTPATIENT)
Facility: CLINIC | Age: 63
End: 2023-10-11
Payer: OTHER GOVERNMENT

## 2023-10-11 VITALS
WEIGHT: 183.13 LBS | RESPIRATION RATE: 16 BRPM | BODY MASS INDEX: 27.12 KG/M2 | HEART RATE: 73 BPM | HEIGHT: 69 IN | SYSTOLIC BLOOD PRESSURE: 128 MMHG | TEMPERATURE: 97.6 F | OXYGEN SATURATION: 95 % | DIASTOLIC BLOOD PRESSURE: 84 MMHG

## 2023-10-11 DIAGNOSIS — F51.04 PSYCHOPHYSIOLOGICAL INSOMNIA: Primary | ICD-10-CM

## 2023-10-11 DIAGNOSIS — I48.91 ATRIAL FIBRILLATION, UNSPECIFIED TYPE (HCC): ICD-10-CM

## 2023-10-11 DIAGNOSIS — G47.33 OSA ON CPAP: ICD-10-CM

## 2023-10-11 PROBLEM — J01.90 ACUTE NON-RECURRENT SINUSITIS: Status: RESOLVED | Noted: 2019-11-11 | Resolved: 2023-10-11

## 2023-10-11 PROCEDURE — 99204 OFFICE O/P NEW MOD 45 MIN: CPT | Performed by: STUDENT IN AN ORGANIZED HEALTH CARE EDUCATION/TRAINING PROGRAM

## 2023-10-11 NOTE — PROGRESS NOTES
Chief Complaint   Patient presents with    Establish Care     No concerns at the moment       1. \"Have you been to the ER, urgent care clinic since your last visit? Hospitalized since your last visit? \" No    2. \"Have you seen or consulted any other health care providers outside of the [address] [place] [address] since your last visit? \" Yes sports medicine for knee pain      3. For patients aged 43-73: Has the patient had a colonoscopy / FIT/ Cologuard?  Yes - no Care Gap present

## 2023-10-22 PROBLEM — J06.9 URI, ACUTE: Status: RESOLVED | Noted: 2019-11-11 | Resolved: 2023-10-22

## 2023-10-22 PROBLEM — R05.9 COUGH: Status: RESOLVED | Noted: 2019-11-11 | Resolved: 2023-10-22

## 2024-04-12 RX ORDER — MONTELUKAST SODIUM 10 MG/1
TABLET ORAL
Qty: 90 TABLET | Refills: 2 | Status: SHIPPED | OUTPATIENT
Start: 2024-04-12

## 2024-10-21 RX ORDER — MONTELUKAST SODIUM 10 MG/1
TABLET ORAL
Qty: 90 TABLET | Refills: 2 | OUTPATIENT
Start: 2024-10-21

## 2024-11-07 ENCOUNTER — OFFICE VISIT (OUTPATIENT)
Facility: CLINIC | Age: 64
End: 2024-11-07
Payer: OTHER GOVERNMENT

## 2024-11-07 ENCOUNTER — HOSPITAL ENCOUNTER (OUTPATIENT)
Facility: HOSPITAL | Age: 64
Setting detail: SPECIMEN
Discharge: HOME | End: 2024-11-10
Payer: OTHER GOVERNMENT

## 2024-11-07 VITALS
DIASTOLIC BLOOD PRESSURE: 86 MMHG | OXYGEN SATURATION: 97 % | SYSTOLIC BLOOD PRESSURE: 146 MMHG | HEART RATE: 77 BPM | BODY MASS INDEX: 26.54 KG/M2 | RESPIRATION RATE: 14 BRPM | TEMPERATURE: 97.7 F | HEIGHT: 70 IN | WEIGHT: 185.38 LBS

## 2024-11-07 DIAGNOSIS — F51.04 PSYCHOPHYSIOLOGICAL INSOMNIA: ICD-10-CM

## 2024-11-07 DIAGNOSIS — G47.33 OSA ON CPAP: ICD-10-CM

## 2024-11-07 DIAGNOSIS — I48.91 ATRIAL FIBRILLATION, UNSPECIFIED TYPE (HCC): ICD-10-CM

## 2024-11-07 DIAGNOSIS — N52.9 ERECTILE DYSFUNCTION, UNSPECIFIED ERECTILE DYSFUNCTION TYPE: ICD-10-CM

## 2024-11-07 DIAGNOSIS — Z00.00 VISIT FOR WELL MAN HEALTH CHECK: Primary | ICD-10-CM

## 2024-11-07 DIAGNOSIS — R05.9 COUGH, UNSPECIFIED TYPE: ICD-10-CM

## 2024-11-07 DIAGNOSIS — H93.13 TINNITUS OF BOTH EARS: ICD-10-CM

## 2024-11-07 DIAGNOSIS — Z00.00 VISIT FOR WELL MAN HEALTH CHECK: ICD-10-CM

## 2024-11-07 DIAGNOSIS — J30.1 SEASONAL ALLERGIC RHINITIS DUE TO POLLEN: ICD-10-CM

## 2024-11-07 LAB
ALBUMIN SERPL-MCNC: 4.3 G/DL (ref 3.4–5)
ALBUMIN/GLOB SERPL: 1.4 (ref 0.8–1.7)
ALP SERPL-CCNC: 66 U/L (ref 45–117)
ALT SERPL-CCNC: 31 U/L (ref 16–61)
ANION GAP SERPL CALC-SCNC: 6 MMOL/L (ref 3–18)
AST SERPL-CCNC: 19 U/L (ref 10–38)
BILIRUB SERPL-MCNC: 0.9 MG/DL (ref 0.2–1)
BUN SERPL-MCNC: 14 MG/DL (ref 7–18)
BUN/CREAT SERPL: 17 (ref 12–20)
CALCIUM SERPL-MCNC: 9.6 MG/DL (ref 8.5–10.1)
CHLORIDE SERPL-SCNC: 103 MMOL/L (ref 100–111)
CHOLEST SERPL-MCNC: 173 MG/DL
CO2 SERPL-SCNC: 26 MMOL/L (ref 21–32)
CREAT SERPL-MCNC: 0.81 MG/DL (ref 0.6–1.3)
ERYTHROCYTE [DISTWIDTH] IN BLOOD BY AUTOMATED COUNT: 12.6 % (ref 11.6–14.5)
EST. AVERAGE GLUCOSE BLD GHB EST-MCNC: 108 MG/DL
GLOBULIN SER CALC-MCNC: 3 G/DL (ref 2–4)
GLUCOSE SERPL-MCNC: 107 MG/DL (ref 74–99)
HBA1C MFR BLD: 5.4 % (ref 4.2–5.6)
HCT VFR BLD AUTO: 46.1 % (ref 36–48)
HDLC SERPL-MCNC: 73 MG/DL (ref 40–60)
HDLC SERPL: 2.4 (ref 0–5)
HGB BLD-MCNC: 14.8 G/DL (ref 13–16)
LDLC SERPL CALC-MCNC: 89.8 MG/DL (ref 0–100)
LIPID PANEL: ABNORMAL
MCH RBC QN AUTO: 31.4 PG (ref 24–34)
MCHC RBC AUTO-ENTMCNC: 32.1 G/DL (ref 31–37)
MCV RBC AUTO: 97.9 FL (ref 78–100)
NRBC # BLD: 0 K/UL (ref 0–0.01)
NRBC BLD-RTO: 0 PER 100 WBC
PLATELET # BLD AUTO: 272 K/UL (ref 135–420)
PMV BLD AUTO: 10.5 FL (ref 9.2–11.8)
POTASSIUM SERPL-SCNC: 4.3 MMOL/L (ref 3.5–5.5)
PROT SERPL-MCNC: 7.3 G/DL (ref 6.4–8.2)
RBC # BLD AUTO: 4.71 M/UL (ref 4.35–5.65)
SODIUM SERPL-SCNC: 135 MMOL/L (ref 136–145)
TRIGL SERPL-MCNC: 51 MG/DL
VLDLC SERPL CALC-MCNC: 10.2 MG/DL
WBC # BLD AUTO: 8.5 K/UL (ref 4.6–13.2)

## 2024-11-07 PROCEDURE — 80061 LIPID PANEL: CPT

## 2024-11-07 PROCEDURE — 85027 COMPLETE CBC AUTOMATED: CPT

## 2024-11-07 PROCEDURE — 36415 COLL VENOUS BLD VENIPUNCTURE: CPT

## 2024-11-07 PROCEDURE — 83036 HEMOGLOBIN GLYCOSYLATED A1C: CPT

## 2024-11-07 PROCEDURE — 99396 PREV VISIT EST AGE 40-64: CPT | Performed by: STUDENT IN AN ORGANIZED HEALTH CARE EDUCATION/TRAINING PROGRAM

## 2024-11-07 PROCEDURE — 80053 COMPREHEN METABOLIC PANEL: CPT

## 2024-11-07 RX ORDER — SILDENAFIL 100 MG/1
100 TABLET, FILM COATED ORAL DAILY PRN
Qty: 30 TABLET | Refills: 2 | Status: SHIPPED | OUTPATIENT
Start: 2024-11-07

## 2024-11-07 RX ORDER — MONTELUKAST SODIUM 10 MG/1
10 TABLET ORAL DAILY
Qty: 90 TABLET | Refills: 3 | Status: SHIPPED | OUTPATIENT
Start: 2024-11-07

## 2024-11-07 RX ORDER — FLUOROURACIL 50 MG/G
CREAM TOPICAL
Qty: 40 G | Status: CANCELLED | OUTPATIENT
Start: 2024-11-07

## 2024-11-07 RX ORDER — ZOLPIDEM TARTRATE 5 MG/1
5 TABLET ORAL NIGHTLY PRN
Qty: 30 TABLET | Refills: 1 | Status: SHIPPED | OUTPATIENT
Start: 2024-11-07 | End: 2025-02-05

## 2024-11-07 RX ORDER — ASPIRIN 81 MG/1
81 TABLET ORAL DAILY
Qty: 90 TABLET | Refills: 3 | Status: SHIPPED | OUTPATIENT
Start: 2024-11-07 | End: 2024-11-07

## 2024-11-07 RX ORDER — MELOXICAM 15 MG/1
15 TABLET ORAL DAILY
COMMUNITY
Start: 2024-02-01

## 2024-11-07 SDOH — ECONOMIC STABILITY: INCOME INSECURITY: HOW HARD IS IT FOR YOU TO PAY FOR THE VERY BASICS LIKE FOOD, HOUSING, MEDICAL CARE, AND HEATING?: NOT HARD AT ALL

## 2024-11-07 SDOH — ECONOMIC STABILITY: FOOD INSECURITY: WITHIN THE PAST 12 MONTHS, THE FOOD YOU BOUGHT JUST DIDN'T LAST AND YOU DIDN'T HAVE MONEY TO GET MORE.: NEVER TRUE

## 2024-11-07 SDOH — ECONOMIC STABILITY: FOOD INSECURITY: WITHIN THE PAST 12 MONTHS, YOU WORRIED THAT YOUR FOOD WOULD RUN OUT BEFORE YOU GOT MONEY TO BUY MORE.: NEVER TRUE

## 2024-11-07 ASSESSMENT — PATIENT HEALTH QUESTIONNAIRE - PHQ9
2. FEELING DOWN, DEPRESSED OR HOPELESS: NOT AT ALL
SUM OF ALL RESPONSES TO PHQ QUESTIONS 1-9: 0
1. LITTLE INTEREST OR PLEASURE IN DOING THINGS: NOT AT ALL
SUM OF ALL RESPONSES TO PHQ9 QUESTIONS 1 & 2: 0

## 2024-11-07 NOTE — PROGRESS NOTES
Jairo Carrizales (: 1960) is a 63 y.o. male, established patient, here for evaluation of the following chief complaint(s):  Annual Exam (Knee pain behind the right knee. Patient states he has had a cough for a week that just won't go away. Patient states he needs refills on all medication.)        Assessment & Plan  1. Cough - Persistent for a week, negative for COVID-19.  - Recommend Zyrtec or flonase  - Suspect viral etiology, discussed expectant management    2. Right knee pain - Persistent pain, history of gel shots providing relief.  - Follow up with Ortho for repeat injections.    3. Hypertension - Elevated BP noted.  - Advise home monitoring and limit salt intake.  - Suggest Mrs. Liu as a salt alternative.    4. Insomnia  - Rare ambien use. 30 pills lasting a year.  reviewed and appropriate.   - Ambien refilled    5. ED  - Good response to Viagra as needed.  Refill placed.    6.tinnitus  Referral to ENT for hearing test due to tinnitus.    7.  Preventative care  - Order labs for annual checkup.  - Encourage shingles vaccine, available at local pharmacies.  -Colonoscopy due next year      Results    1. Visit for [facility] health check  -     Hemoglobin A1C; Future  -     Comprehensive Metabolic Panel; Future  -     Lipid Panel; Future  -     CBC; Future  2. Cough, unspecified type  3. STEPHON on CPAP  4. Seasonal allergic rhinitis due to pollen  -     montelukast (SINGULAIR) 10 MG tablet; Take 1 tablet by mouth daily, Disp-90 tablet, R-3Normal  5. Psychophysiological insomnia  -     zolpidem (AMBIEN) 5 MG tablet; Take 1 tablet by mouth nightly as needed for Sleep for up to 90 days. Max Daily Amount: 5 mg, Disp-30 tablet, R-1Normal  6. Erectile dysfunction, unspecified erectile dysfunction type  -     sildenafil (VIAGRA) 100 MG tablet; Take 1 tablet by mouth daily as needed for Erectile Dysfunction, Disp-30 tablet, R-2Normal  7. Atrial fibrillation, unspecified type (HCC)  8. Tinnitus of both ears  -

## 2025-02-18 ENCOUNTER — TELEMEDICINE (OUTPATIENT)
Facility: CLINIC | Age: 65
End: 2025-02-18

## 2025-02-18 DIAGNOSIS — Z01.818 PRE-OP EVALUATION: Primary | ICD-10-CM

## 2025-02-18 DIAGNOSIS — M79.672 LEFT FOOT PAIN: ICD-10-CM

## 2025-02-18 SDOH — ECONOMIC STABILITY: FOOD INSECURITY: WITHIN THE PAST 12 MONTHS, YOU WORRIED THAT YOUR FOOD WOULD RUN OUT BEFORE YOU GOT MONEY TO BUY MORE.: NEVER TRUE

## 2025-02-18 SDOH — ECONOMIC STABILITY: FOOD INSECURITY: WITHIN THE PAST 12 MONTHS, THE FOOD YOU BOUGHT JUST DIDN'T LAST AND YOU DIDN'T HAVE MONEY TO GET MORE.: NEVER TRUE

## 2025-02-18 ASSESSMENT — PATIENT HEALTH QUESTIONNAIRE - PHQ9
1. LITTLE INTEREST OR PLEASURE IN DOING THINGS: NOT AT ALL
2. FEELING DOWN, DEPRESSED OR HOPELESS: NOT AT ALL
SUM OF ALL RESPONSES TO PHQ9 QUESTIONS 1 & 2: 0
SUM OF ALL RESPONSES TO PHQ QUESTIONS 1-9: 0

## 2025-02-18 NOTE — PROGRESS NOTES
\"Have you been to the ER, urgent care clinic since your last visit?  Hospitalized since your last visit?\"    NO    “Have you seen or consulted any other health care providers outside our system since your last visit?”    YES - When: approximately 3  weeks ago.  Where and Why: orthopedic.

## 2025-02-18 NOTE — PROGRESS NOTES
Jairo Carrizales (: 1960) is a 64 y.o. male, established patient, here for evaluation of the following chief complaint(s):   Pre-op Exam (Surgery of removal bone spurs schedule for 24)          Assessment & Plan  1. Preoperative clearance for left foot surgery: Low-risk candidate for surgery. I see not absolute contraindication to his planned foot surgery. Optimized as much as able at this time. Blood work from 2024 normal. Risk factors: sleep apnea and cardiac issues, but cardiologist cleared for surgery.  - Ok to hold baby aspirin 81 mg and ibuprofen 10 days prior to surgery  - I have faxed my note through [employer]    2. Knee pain: Reports significant knee pain after extended walking and work activities.  - Complete and mail DMV form for handicap placard       Results  - Blood work conducted two weeks ago was normal  1. Pre-op evaluation  2. Left foot pain    Return for your previously scheduled next visit.         Subjective   History of Present Illness  The patient presents via virtual visit for preoperative clearance for left foot surgery.    Scheduled for left foot surgery on 2025 at [facility] [place]. History of open heart surgery, meniscus repair, and ulnar nerve repositioning. No anesthesia complications in previous surgeries. Under care of Dr. Laith Esqueda, cardiologist specializing in electrophysiology, with follow-up in 2025. Recent blood work normal. Takes baby aspirin 81 mg, to discontinue with ibuprofen 10 days prior to surgery. Granted 12-week leave post-surgery.    Reports significant knee pain, believes may qualify for handicap placard. Severe pain after walking for an hour during recent trip to [place]. Work environment with frequent walking and stairs exacerbates discomfort. Acknowledges need to adapt to body changes with age.    Had dermatitis, prescribed cream cleared it up. Using CPAP machine without issues, adjusting straps for optimal

## 2025-03-30 DIAGNOSIS — J30.1 SEASONAL ALLERGIC RHINITIS DUE TO POLLEN: ICD-10-CM

## 2025-03-30 DIAGNOSIS — N52.9 ERECTILE DYSFUNCTION, UNSPECIFIED ERECTILE DYSFUNCTION TYPE: ICD-10-CM

## 2025-03-30 RX ORDER — MONTELUKAST SODIUM 10 MG/1
10 TABLET ORAL DAILY
Qty: 90 TABLET | Refills: 3 | Status: CANCELLED | OUTPATIENT
Start: 2025-03-30

## 2025-04-01 RX ORDER — SILDENAFIL 100 MG/1
100 TABLET, FILM COATED ORAL DAILY PRN
Qty: 30 TABLET | Refills: 2 | Status: SHIPPED | OUTPATIENT
Start: 2025-04-01

## 2025-05-23 ENCOUNTER — TELEPHONE (OUTPATIENT)
Facility: CLINIC | Age: 65
End: 2025-05-23

## 2025-06-23 SDOH — HEALTH STABILITY: PHYSICAL HEALTH: ON AVERAGE, HOW MANY DAYS PER WEEK DO YOU ENGAGE IN MODERATE TO STRENUOUS EXERCISE (LIKE A BRISK WALK)?: 0 DAYS

## 2025-06-23 ASSESSMENT — LIFESTYLE VARIABLES
HAS A RELATIVE, FRIEND, DOCTOR, OR ANOTHER HEALTH PROFESSIONAL EXPRESSED CONCERN ABOUT YOUR DRINKING OR SUGGESTED YOU CUT DOWN: NO
HOW OFTEN DURING THE LAST YEAR HAVE YOU FAILED TO DO WHAT WAS NORMALLY EXPECTED FROM YOU BECAUSE OF DRINKING: NEVER
HOW OFTEN DURING THE LAST YEAR HAVE YOU FAILED TO DO WHAT WAS NORMALLY EXPECTED FROM YOU BECAUSE OF DRINKING: NEVER
HOW OFTEN DO YOU HAVE SIX OR MORE DRINKS ON ONE OCCASION: 3
HOW OFTEN DURING THE LAST YEAR HAVE YOU FOUND THAT YOU WERE NOT ABLE TO STOP DRINKING ONCE YOU HAD STARTED: NEVER
HAVE YOU OR SOMEONE ELSE BEEN INJURED AS A RESULT OF YOUR DRINKING: NO
HAS A RELATIVE, FRIEND, DOCTOR, OR ANOTHER HEALTH PROFESSIONAL EXPRESSED CONCERN ABOUT YOUR DRINKING OR SUGGESTED YOU CUT DOWN: NO
HOW MANY STANDARD DRINKS CONTAINING ALCOHOL DO YOU HAVE ON A TYPICAL DAY: 1
HAVE YOU OR SOMEONE ELSE BEEN INJURED AS A RESULT OF YOUR DRINKING: NO
HOW OFTEN DURING THE LAST YEAR HAVE YOU HAD A FEELING OF GUILT OR REMORSE AFTER DRINKING: NEVER
HOW MANY STANDARD DRINKS CONTAINING ALCOHOL DO YOU HAVE ON A TYPICAL DAY: 1 OR 2
HOW OFTEN DURING THE LAST YEAR HAVE YOU BEEN UNABLE TO REMEMBER WHAT HAPPENED THE NIGHT BEFORE BECAUSE YOU HAD BEEN DRINKING: NEVER
HOW OFTEN DURING THE LAST YEAR HAVE YOU FOUND THAT YOU WERE NOT ABLE TO STOP DRINKING ONCE YOU HAD STARTED: NEVER
HOW OFTEN DURING THE LAST YEAR HAVE YOU NEEDED AN ALCOHOLIC DRINK FIRST THING IN THE MORNING TO GET YOURSELF GOING AFTER A NIGHT OF HEAVY DRINKING: NEVER
HOW OFTEN DO YOU HAVE A DRINK CONTAINING ALCOHOL: 4 OR MORE TIMES A WEEK
HOW OFTEN DURING THE LAST YEAR HAVE YOU BEEN UNABLE TO REMEMBER WHAT HAPPENED THE NIGHT BEFORE BECAUSE YOU HAD BEEN DRINKING: NEVER
HOW OFTEN DURING THE LAST YEAR HAVE YOU HAD A FEELING OF GUILT OR REMORSE AFTER DRINKING: NEVER
HOW OFTEN DO YOU HAVE A DRINK CONTAINING ALCOHOL: 5
HOW OFTEN DURING THE LAST YEAR HAVE YOU NEEDED AN ALCOHOLIC DRINK FIRST THING IN THE MORNING TO GET YOURSELF GOING AFTER A NIGHT OF HEAVY DRINKING: NEVER

## 2025-06-23 ASSESSMENT — PATIENT HEALTH QUESTIONNAIRE - PHQ9
SUM OF ALL RESPONSES TO PHQ QUESTIONS 1-9: 0
2. FEELING DOWN, DEPRESSED OR HOPELESS: NOT AT ALL
SUM OF ALL RESPONSES TO PHQ QUESTIONS 1-9: 0
SUM OF ALL RESPONSES TO PHQ QUESTIONS 1-9: 0
1. LITTLE INTEREST OR PLEASURE IN DOING THINGS: NOT AT ALL
SUM OF ALL RESPONSES TO PHQ QUESTIONS 1-9: 0

## 2025-06-25 ENCOUNTER — OFFICE VISIT (OUTPATIENT)
Facility: CLINIC | Age: 65
End: 2025-06-25
Payer: MEDICARE

## 2025-06-25 ENCOUNTER — HOSPITAL ENCOUNTER (OUTPATIENT)
Facility: HOSPITAL | Age: 65
Setting detail: SPECIMEN
Discharge: HOME | End: 2025-06-28
Payer: MEDICARE

## 2025-06-25 VITALS
RESPIRATION RATE: 14 BRPM | BODY MASS INDEX: 25.66 KG/M2 | TEMPERATURE: 98.3 F | HEIGHT: 70 IN | SYSTOLIC BLOOD PRESSURE: 140 MMHG | DIASTOLIC BLOOD PRESSURE: 72 MMHG | HEART RATE: 87 BPM | OXYGEN SATURATION: 97 % | WEIGHT: 179.25 LBS

## 2025-06-25 DIAGNOSIS — M25.562 CHRONIC PAIN OF BOTH KNEES: ICD-10-CM

## 2025-06-25 DIAGNOSIS — Z13.6 SCREENING FOR AAA (AORTIC ABDOMINAL ANEURYSM): ICD-10-CM

## 2025-06-25 DIAGNOSIS — Z23 ENCOUNTER FOR IMMUNIZATION: ICD-10-CM

## 2025-06-25 DIAGNOSIS — G89.29 CHRONIC PAIN OF BOTH KNEES: ICD-10-CM

## 2025-06-25 DIAGNOSIS — Z00.00 WELCOME TO MEDICARE PREVENTIVE VISIT: Primary | ICD-10-CM

## 2025-06-25 DIAGNOSIS — J30.1 SEASONAL ALLERGIC RHINITIS DUE TO POLLEN: ICD-10-CM

## 2025-06-25 DIAGNOSIS — F51.01 PRIMARY INSOMNIA: ICD-10-CM

## 2025-06-25 DIAGNOSIS — Z00.00 WELCOME TO MEDICARE PREVENTIVE VISIT: ICD-10-CM

## 2025-06-25 DIAGNOSIS — G47.33 OSA ON CPAP: ICD-10-CM

## 2025-06-25 DIAGNOSIS — I48.91 ATRIAL FIBRILLATION, UNSPECIFIED TYPE (HCC): ICD-10-CM

## 2025-06-25 DIAGNOSIS — Z87.891 PERSONAL HISTORY OF TOBACCO USE, PRESENTING HAZARDS TO HEALTH: ICD-10-CM

## 2025-06-25 DIAGNOSIS — M25.561 CHRONIC PAIN OF BOTH KNEES: ICD-10-CM

## 2025-06-25 LAB
ALBUMIN SERPL-MCNC: 4.1 G/DL (ref 3.4–5)
ALBUMIN/GLOB SERPL: 1.4 (ref 0.8–1.7)
ALP SERPL-CCNC: 54 U/L (ref 45–117)
ALT SERPL-CCNC: 59 U/L (ref 10–50)
ANION GAP SERPL CALC-SCNC: 10 MMOL/L (ref 3–18)
AST SERPL-CCNC: 48 U/L (ref 10–38)
BILIRUB SERPL-MCNC: 0.8 MG/DL (ref 0.2–1)
BUN SERPL-MCNC: 9 MG/DL (ref 6–23)
BUN/CREAT SERPL: 12 (ref 12–20)
CALCIUM SERPL-MCNC: 9.9 MG/DL (ref 8.5–10.1)
CHLORIDE SERPL-SCNC: 102 MMOL/L (ref 98–107)
CHOLEST SERPL-MCNC: 186 MG/DL
CO2 SERPL-SCNC: 27 MMOL/L (ref 21–32)
CREAT SERPL-MCNC: 0.74 MG/DL (ref 0.6–1.3)
ERYTHROCYTE [DISTWIDTH] IN BLOOD BY AUTOMATED COUNT: 12.8 % (ref 11.6–14.5)
EST. AVERAGE GLUCOSE BLD GHB EST-MCNC: 112 MG/DL
GLOBULIN SER CALC-MCNC: 2.9 G/DL (ref 2–4)
GLUCOSE SERPL-MCNC: 102 MG/DL (ref 74–108)
HBA1C MFR BLD: 5.5 % (ref 4.2–5.6)
HCT VFR BLD AUTO: 46 % (ref 36–48)
HDLC SERPL-MCNC: 63 MG/DL (ref 40–60)
HDLC SERPL: 3 (ref 0–5)
HGB BLD-MCNC: 14.9 G/DL (ref 13–16)
LDLC SERPL CALC-MCNC: 110 MG/DL (ref 0–100)
MCH RBC QN AUTO: 31.4 PG (ref 24–34)
MCHC RBC AUTO-ENTMCNC: 32.4 G/DL (ref 31–37)
MCV RBC AUTO: 96.8 FL (ref 78–100)
NRBC # BLD: 0 K/UL (ref 0–0.01)
NRBC BLD-RTO: 0 PER 100 WBC
PLATELET # BLD AUTO: 288 K/UL (ref 135–420)
PMV BLD AUTO: 10.1 FL (ref 9.2–11.8)
POTASSIUM SERPL-SCNC: 4.5 MMOL/L (ref 3.5–5.5)
PROT SERPL-MCNC: 7 G/DL (ref 6.4–8.2)
RBC # BLD AUTO: 4.75 M/UL (ref 4.35–5.65)
SODIUM SERPL-SCNC: 139 MMOL/L (ref 136–145)
TRIGL SERPL-MCNC: 66 MG/DL (ref 0–150)
VLDLC SERPL CALC-MCNC: 13 MG/DL
WBC # BLD AUTO: 5.3 K/UL (ref 4.6–13.2)

## 2025-06-25 PROCEDURE — 80061 LIPID PANEL: CPT

## 2025-06-25 PROCEDURE — G8419 CALC BMI OUT NRM PARAM NOF/U: HCPCS | Performed by: STUDENT IN AN ORGANIZED HEALTH CARE EDUCATION/TRAINING PROGRAM

## 2025-06-25 PROCEDURE — 85027 COMPLETE CBC AUTOMATED: CPT

## 2025-06-25 PROCEDURE — G0402 INITIAL PREVENTIVE EXAM: HCPCS | Performed by: STUDENT IN AN ORGANIZED HEALTH CARE EDUCATION/TRAINING PROGRAM

## 2025-06-25 PROCEDURE — 80053 COMPREHEN METABOLIC PANEL: CPT

## 2025-06-25 PROCEDURE — G8427 DOCREV CUR MEDS BY ELIG CLIN: HCPCS | Performed by: STUDENT IN AN ORGANIZED HEALTH CARE EDUCATION/TRAINING PROGRAM

## 2025-06-25 PROCEDURE — G2211 COMPLEX E/M VISIT ADD ON: HCPCS | Performed by: STUDENT IN AN ORGANIZED HEALTH CARE EDUCATION/TRAINING PROGRAM

## 2025-06-25 PROCEDURE — G0009 ADMIN PNEUMOCOCCAL VACCINE: HCPCS | Performed by: STUDENT IN AN ORGANIZED HEALTH CARE EDUCATION/TRAINING PROGRAM

## 2025-06-25 PROCEDURE — 99214 OFFICE O/P EST MOD 30 MIN: CPT | Performed by: STUDENT IN AN ORGANIZED HEALTH CARE EDUCATION/TRAINING PROGRAM

## 2025-06-25 PROCEDURE — 83036 HEMOGLOBIN GLYCOSYLATED A1C: CPT

## 2025-06-25 PROCEDURE — 1036F TOBACCO NON-USER: CPT | Performed by: STUDENT IN AN ORGANIZED HEALTH CARE EDUCATION/TRAINING PROGRAM

## 2025-06-25 PROCEDURE — 3017F COLORECTAL CA SCREEN DOC REV: CPT | Performed by: STUDENT IN AN ORGANIZED HEALTH CARE EDUCATION/TRAINING PROGRAM

## 2025-06-25 PROCEDURE — 1123F ACP DISCUSS/DSCN MKR DOCD: CPT | Performed by: STUDENT IN AN ORGANIZED HEALTH CARE EDUCATION/TRAINING PROGRAM

## 2025-06-25 PROCEDURE — 90677 PCV20 VACCINE IM: CPT | Performed by: STUDENT IN AN ORGANIZED HEALTH CARE EDUCATION/TRAINING PROGRAM

## 2025-06-25 RX ORDER — MONTELUKAST SODIUM 10 MG/1
10 TABLET ORAL DAILY
Qty: 90 TABLET | Refills: 3 | Status: SHIPPED | OUTPATIENT
Start: 2025-06-25

## 2025-06-25 RX ORDER — ZOLPIDEM TARTRATE 5 MG/1
5 TABLET ORAL AS NEEDED
COMMUNITY
Start: 2025-04-05

## 2025-06-25 ASSESSMENT — VISUAL ACUITY
OS_CC: 20/20
OD_CC: 20/20

## 2025-06-25 NOTE — PROGRESS NOTES
Have you been to the ER, urgent care clinic since your last visit?  Hospitalized since your last visit?   NO    Have you seen or consulted any other health care providers outside our system since your last visit?   YES - When: approximately 1 months ago.  Where and Why: [facility].           
correction      With correction 20/20 20/20 20/20      Vitals:    06/25/25 0758   BP: (!) 140/72   Pulse: 87   Resp: 14   Temp: 98.3 °F (36.8 °C)   TempSrc: Tympanic   SpO2: 97%   Weight: 81.3 kg (179 lb 4 oz)   Height: 1.778 m (5' 10\")      Body mass index is 25.72 kg/m².      Physical Exam  General appearance - Alert, NAD, normal appearance  Head - Atraumatic. Normocephalic.   Eyes - EOMI. Sclera white.   Ears - Hearing grossly normal.    Respiratory - LCTAB. Effort normal, without respiratory distress. No wheeze/rale/rhonchi  Heart - Normal rate, regular rhythm. No m/r/r  Neurological - No gross focal deficits. Speech normal. Alert and oriented. Mental status is at baseline.   Musculoskeletal - Grossly normal ROM, Gait normal.    Skin - normal coloration and normal turgor.               Allergies   Allergen Reactions    Naproxen Other (See Comments)     Felt like very drowsy and near syncope.    Other Other (See Comments)     Prior to Visit Medications    Medication Sig Taking? Authorizing Provider   zolpidem (AMBIEN) 5 MG tablet Take 1 tablet by mouth as needed for Sleep. Yes Suresh Major MD   montelukast (SINGULAIR) 10 MG tablet Take 1 tablet by mouth daily Yes Darinel Cohn MD   sildenafil (VIAGRA) 100 MG tablet Take 1 tablet by mouth daily as needed for Erectile Dysfunction Yes Darinel Cohn MD   meloxicam (MOBIC) 15 MG tablet Take 1 tablet by mouth daily Yes Suresh Major MD   Misc. Devices (CPAP MACHINE) MISC by Other route  Patient not taking: Reported on 6/25/2025  Suresh Major MD   calcipotriene (DOVONEX) 0.005 % cream   Suresh Major MD   fluorouracil (EFUDEX) 5 % cream   Suresh Major MD   Multiple Vitamin (THERA/BETA-CAROTENE) TABS Take by mouth  Patient not taking: Reported on 6/25/2025  Suresh Major MD   cetirizine (ZYRTEC) 10 MG tablet Take 1 tablet by mouth as needed for Allergies or Rhinitis  Patient not taking: Reported on 6/25/2025

## 2025-07-01 ENCOUNTER — RESULTS FOLLOW-UP (OUTPATIENT)
Facility: CLINIC | Age: 65
End: 2025-07-01

## 2025-07-01 ENCOUNTER — CLINICAL DOCUMENTATION (OUTPATIENT)
Facility: CLINIC | Age: 65
End: 2025-07-01

## 2025-07-01 NOTE — PROGRESS NOTES
Patient requested letter of support for better disability benefits regarding possible link between his obstructive sleep apnea and A-fib.    \"Upon my review of medical literature from NIH, American Heart Association and others, I do not see a correlation that is \"as least as likely as not\" that A-fib causes sleep apnea.  There are numerous studies to show that central and obstructive sleep apnea can contribute to A-fib but not vice versa.    If you would like to pursue this further, I recommend discussion with either a cardiologist or sleep specialist.\"    Darinel Cohn MD

## 2025-07-02 ENCOUNTER — HOSPITAL ENCOUNTER (OUTPATIENT)
Facility: HOSPITAL | Age: 65
Discharge: HOME | End: 2025-07-05
Attending: STUDENT IN AN ORGANIZED HEALTH CARE EDUCATION/TRAINING PROGRAM
Payer: MEDICARE

## 2025-07-02 DIAGNOSIS — Z87.891 PERSONAL HISTORY OF TOBACCO USE, PRESENTING HAZARDS TO HEALTH: ICD-10-CM

## 2025-07-02 DIAGNOSIS — Z13.6 SCREENING FOR AAA (AORTIC ABDOMINAL ANEURYSM): ICD-10-CM

## 2025-07-02 PROCEDURE — 76706 US ABDL AORTA SCREEN AAA: CPT

## 2025-08-27 ENCOUNTER — PATIENT MESSAGE (OUTPATIENT)
Facility: CLINIC | Age: 65
End: 2025-08-27

## 2025-08-27 DIAGNOSIS — F51.01 PRIMARY INSOMNIA: Primary | ICD-10-CM

## 2025-08-28 RX ORDER — ZOLPIDEM TARTRATE 5 MG/1
5 TABLET ORAL AS NEEDED
Qty: 30 TABLET | Refills: 1 | Status: SHIPPED | OUTPATIENT
Start: 2025-08-28 | End: 2026-02-24

## 2025-08-28 RX ORDER — MELOXICAM 15 MG/1
15 TABLET ORAL DAILY
Qty: 30 TABLET | Refills: 2 | Status: SHIPPED | OUTPATIENT
Start: 2025-08-28